# Patient Record
Sex: MALE | Race: WHITE
[De-identification: names, ages, dates, MRNs, and addresses within clinical notes are randomized per-mention and may not be internally consistent; named-entity substitution may affect disease eponyms.]

---

## 2017-10-27 ENCOUNTER — HOSPITAL ENCOUNTER (OUTPATIENT)
Dept: HOSPITAL 80 - FED | Age: 82
Setting detail: OBSERVATION
LOS: 1 days | Discharge: HOME | End: 2017-10-28
Attending: INTERNAL MEDICINE | Admitting: INTERNAL MEDICINE
Payer: COMMERCIAL

## 2017-10-27 LAB
INR PPP: 1.08 (ref 0.83–1.16)
PLATELET # BLD: 207 10^3/UL (ref 150–400)
PROTHROMBIN TIME: 13.9 SEC (ref 12–15)

## 2017-10-27 PROCEDURE — 74177 CT ABD & PELVIS W/CONTRAST: CPT

## 2017-10-27 PROCEDURE — G0378 HOSPITAL OBSERVATION PER HR: HCPCS

## 2017-10-27 RX ADMIN — SODIUM CHLORIDE SCH MLS: 900 INJECTION, SOLUTION INTRAVENOUS at 23:26

## 2017-10-27 NOTE — GHP
[f rep st]



                                                            HISTORY AND PHYSICAL





DATE OF ADMISSION:  10/27/2017



CHIEF COMPLAINT:  Bloody stool and abdominal pain.



HISTORY OF PRESENT ILLNESS:  This is an 87-year-old man with minimal past medical history who is pres
enting with 24 hours of lower abdominal pain along with diarrhea, largely consistent of bright red bl
ood.  He notes that his symptoms began shortly after eating crab cakes.  He initially thought he had 
food poisoning, but when this progressed to include bloody stool, he became more concerned.  He discu
ssed this with his primary care physician, who initially recommended he just stay at home and monitor
 his symptoms.  When the symptoms became worse, he was recommended to go to the ER for further evalua
tion.  At the time that I am evaluating him, it is now been several hours since his last bloody bowel
 movement.  His pain in his lower abdomen has improved.  He denies any fevers or chills.  He denies a
ny dizziness.



PAST MEDICAL HISTORY:  GERD, prostate cancer, chronic back pain.



PAST SURGICAL HISTORY:  Prostatectomy, ortho surgeries.



FAMILY HISTORY:  Parents are .



SOCIAL HISTORY:  He is a nonsmoker.  Rare alcohol.  No drug use.



REVIEW OF SYSTEMS:  A 10-point review of systems obtained, negative except as per HPI.



HOME MEDICATIONS:  Include tramadol, ranitidine, ibuprofen, Tylenol.



ALLERGIES:  No known drug allergies.



PHYSICAL EXAMINATION:  VITAL SIGNS:  /81, heart rate 70, respiratory rate 14, O2 sats 88% on ro
om air, temperature is 37.  GENERAL APPEARANCE:  Well-developed, well-nourished man.  Awake and alert
.  No acute distress.  EYES:  Anicteric.  ENT:  Oropharynx clear.  CARDIOVASCULAR:  Regular rate and 
rhythm.  No MRG.  PULMONARY:  CTA bilaterally.  Normal work of breathing.  ABDOMEN:  Soft, nontender.
  Positive bowel sounds.  EXTREMITIES:  No clubbing, cyanosis or edema.  SKIN:  Warm, dry, well perfu
sed.  NEURO/PSYCH:  Oriented and appropriate.  Pleasant.



LABORATORY:  Reviewed.  Significant for white blood cell count of 12.5.  Chemistry and LFTs are unrem
arkable.  Hematocrit 47.4. 



Abdominal CT, personally reviewed and interpreted, shows descending colitis without perforation or ob
struction.



ASSESSMENT AND PLAN:  This is an 87-year-old man with past medical history of prostate cancer, presen
ting with descending colitis, presumably infectious.

1.  Infectious colitis.  Again, patient presenting with abdominal pain, diarrhea and ultimately blood
y diarrhea with evidence of descending colitis on CT.  He was started on Cipro and Flagyl in the [facility] department and this will be continued for the time being.  He has been unable to have another [employer] movement, but GI pathogen panel is currently pending.  Another consideration would be for ischem
ic colitis, especially given his age.  Though in discussion with Radiology, has less of that appearan
ce and overall felt to be more likely infectious in nature.  

2.  Bright red blood per rectum.  Again, this in the setting of above and suspect it is related to th
e same.  At this point, I do not believe that he will require a colonoscopy though if bleeding recurs
, this would need to be considered.  He is currently hemodynamically stable.  H and H are at baseline
.

3.  History of prostate cancer.  This is not likely to be contributing to his current presentation.  
We will continue his usual surveillance.

4.  Leukocytosis.  This is likely stress response and/or related to infectious colitis.  We will repe
at a CBC in the morning.

5.  Disposition:  Observation status.  Suspect he will need less than 48-hour stay for evaluation and
 management of above.

6.  Patient is new to my care.  Old records reviewed and summarized as per HPI and past medical histo
ry.  Care plan reviewed with ER physician including plans for antibiotic therapy.





Job #:  [id number]

## 2017-10-27 NOTE — GHP
[f rep st]



                                                            HISTORY AND PHYSICAL





DATE OF ADMISSION:  10/27/2017



CHIEF COMPLAINT:  Bloody stool and abdominal pain.



HISTORY OF PRESENT ILLNESS:  This is an 87-year-old man with minimal past medical history who is pres
enting with 24 hours of lower abdominal pain along with diarrhea, largely consistent of bright red bl
ood.  He notes that his symptoms began shortly after eating crab cakes.  He initially thought he had 
food poisoning, but when this progressed to include bloody stool, he became more concerned.  He discu
ssed this with his primary care physician, who initially recommended he just stay at home and monitor
 his symptoms.  When the symptoms became worse, he was recommended to go to the ER for further evalua
tion.  At the time that I am evaluating him, it is now been several hours since his last bloody bowel
 movement.  His pain in his lower abdomen has improved.  He denies any fevers or chills.  He denies a
ny dizziness.



PAST MEDICAL HISTORY:  GERD, prostate cancer, chronic back pain.



PAST SURGICAL HISTORY:  Prostatectomy, ortho surgeries.



FAMILY HISTORY:  Parents are .



SOCIAL HISTORY:  He is a nonsmoker.  Rare alcohol.  No drug use.



REVIEW OF SYSTEMS:  A 10-point review of systems obtained, negative except as per HPI.



HOME MEDICATIONS:  Include tramadol, ranitidine, ibuprofen, Tylenol.



ALLERGIES:  No known drug allergies.



PHYSICAL EXAMINATION:  VITAL SIGNS:  /81, heart rate 70, respiratory rate 14, O2 sats 88% on ro
om air, temperature is 37.  GENERAL APPEARANCE:  Well-developed, well-nourished man.  Awake and alert
.  No acute distress.  EYES:  Anicteric.  ENT:  Oropharynx clear.  CARDIOVASCULAR:  Regular rate and 
rhythm.  No MRG.  PULMONARY:  CTA bilaterally.  Normal work of breathing.  ABDOMEN:  Soft, nontender.
  Positive bowel sounds.  EXTREMITIES:  No clubbing, cyanosis or edema.  SKIN:  Warm, dry, well perfu
sed.  NEURO/PSYCH:  Oriented and appropriate.  Pleasant.



LABORATORY:  Reviewed.  Significant for white blood cell count of 12.5.  Chemistry and LFTs are unrem
arkable.  Hematocrit 47.4. 



Abdominal CT, personally reviewed and interpreted, shows descending colitis without perforation or ob
struction.



ASSESSMENT AND PLAN:  This is an 87-year-old man with past medical history of prostate cancer, presen
ting with descending colitis, presumably infectious.

1.  Infectious colitis.  Again, patient presenting with abdominal pain, diarrhea and ultimately blood
y diarrhea with evidence of descending colitis on CT.  He was started on Cipro and Flagyl in the [facility] department and this will be continued for the time being.  He has been unable to have another [employer] movement, but GI pathogen panel is currently pending.  Another consideration would be for ischem
ic colitis, especially given his age.  Though in discussion with Radiology, has less of that appearan
ce and overall felt to be more likely infectious in nature.  

2.  Bright red blood per rectum.  Again, this in the setting of above and suspect it is related to th
e same.  At this point, I do not believe that he will require a colonoscopy though if bleeding recurs
, this would need to be considered.  He is currently hemodynamically stable.  H and H are at baseline
.

3.  History of prostate cancer.  This is not likely to be contributing to his current presentation.  
We will continue his usual surveillance.

4.  Leukocytosis.  This is likely stress response and/or related to infectious colitis.  We will repe
at a CBC in the morning.

5.  Disposition:  Observation status.  Suspect he will need less than 48-hour stay for evaluation and
 management of above.

6.  Patient is new to my care.  Old records reviewed and summarized as per HPI and past medical histo
ry.  Care plan reviewed with ER physician including plans for antibiotic therapy.





Job #:  [id number]

## 2017-10-27 NOTE — EDPHY
H & P


Stated Complaint: llq abd pain/rectal bleeding


HPI/ROS: 





HPI





CHIEF COMPLAINT:  Left lower quadrant abdominal pain,  bright red blood per 

rectum.





HISTORY OF PRESENT ILLNESS:  Patient is a 87-year-old male, history of prostate 

cancer, presents emergency room bright red blood per rectum x1 day.  States 8-9 

episodes of bright red blood per rectum.  This started around 9:00 p.m. last 

night.  Patient reports that last night at 9:00 p.m. he had to have a bowel 

movement and passed bright red blood per rectum.  Again around 2:00 a.m. just 

blood.  Denies any diarrhea.  However he does state that he has some left lower 

quadrant crampy abdominal pain associated with this.  With associated nausea 

but no vomiting.  No fever.  No chest pain or shortness of breath.  He is not 

on any anticoagulation.  He has not any vomiting.  Denies any melenic dark 

stools.





Past Medical History:  Prostate cancer





Past Surgical History:  No recent surgery





Social History:  Denies daily use drugs alcohol tobacco products.





Family History:  Noncontributory.








ROS   


REVIEW OF SYSTEMS:


A comprehensive 10 point review of systems is otherwise negative aside from 

elements mentioned in the history of present illness.








Exam   


Constitutional  appears well nontoxic, triage nursing summary reviewed, vital 

signs reviewed, awake/alert. 


Eyes   normal conjunctivae and sclera, EOMI, PERRLA. 


HENT   normal inspection, atraumatic, moist mucus membranes, no epistaxis, neck 

supple/ no meningismus, no raccoon eyes. 


Respiratory   clear to auscultation bilaterally, normal breath sounds, no 

respiratory distress, no wheezing. 


Cardiovascular   rate normal, regular rhythm, no murmur, no edema, distal 

pulses normal. 


Gastrointestinal   soft, non-tender, no rebound, no guarding, normal bowel 

sounds, no distension, no pulsatile mass. 


Genitourinary   no CVA tenderness. 


Musculoskeletal  no midline vertebral tenderness, full range of motion, no calf 

swelling, no tenderness of extremities, no meningismus, good pulses, 

neurovascularly intact.


Skin   pink, warm, & dry, no rash, skin atraumatic. 


Neurologic   awake, alert and oriented x 3, AAOx3, moves all 4 extremities 

equally, motor intact, sensory intact, CN II-XII intact, normal cerebellar, 

normal vision, normal speech. 


Psychiatric   normal mood/affect. 


Heme/Lymph/Immune   no lymphadenopathy.





Differential Diagnosis:  Includes but is not limited to in a particular order, 

diverticular bleed, acute diverticulitis, hemorrhoidal bleed, colitis, GI bleed





Medical Decision Making:  Plan for this patient IV establishment IV fluid bolus

, type and screen, check basic blood work CBC, CT scan abdomen pelvis with IV 

contrast rule out acute colitis vs diverticulitis.





Re-evaluation:








1837:  Patient CT scan called to me by Dr. Oppenheimer.  Shows diffuse 

descending colitis.  No free air.  No free fluid.  Patient be admitted to the 

hospitalist service for bright red blood per rectum and colitis.  Lactic acid 

is less than 2. Abdomen is not peritoneal there is no pain out of proportion 

with exam.  This most likely infectious colitis.  Will start with IV Flagyl and 

Cipro.  Will admit to the hospitalist service.  


Source: Patient





- Personal History


Current Tetanus/Diphtheria Vaccine: Unsure





- Medical/Surgical History


Hx Asthma: No


Hx Chronic Respiratory Disease: No


Hx Diabetes: No


Hx Cardiac Disease: No


Hx Renal Disease: No


Hx Cirrhosis: No


Hx Alcoholism: No


Hx HIV/AIDS: No


Hx Splenectomy or Spleen Trauma: No


Other PMH: back pain/ arthritis/ortho surg.





- Social History


Smoking Status: Never smoked


Constitutional: 


 Initial Vital Signs











Temperature (C)  37 C   10/27/17 16:24


 


Heart Rate  76   10/27/17 16:24


 


Respiratory Rate  17   10/27/17 16:24


 


Blood Pressure  138/97 H  10/27/17 16:24


 


O2 Sat (%)  93   10/27/17 16:24








 











O2 Delivery Mode               Room Air














Allergies/Adverse Reactions: 


 





No Known Allergies Allergy (Verified 10/27/17 16:24)


 








Home Medications: 














 Medication  Instructions  Recorded


 


NK [No Known Home Meds]  10/27/17














Medical Decision Making





- Diagnostics


Imaging Results: 


 Imaging Impressions





Abdomen CT  10/27/17 16:36


Impression: Descending colitis without perforation or obstruction.


 


Results called Dr. Zayas at 6:04. 


 


General information for patients regarding this examination can be found at 

[email or web address].


 


If you have questions or comments about this report, please contact me at [phone] (hospital) or [phone] (cell). 


 














- Data Points


Laboratory Results: 


 Laboratory Results





 10/27/17 16:59 





 10/27/17 16:59 





 











  10/27/17 10/27/17 10/27/17





  16:59 16:59 16:59


 


WBC      12.56 10^3/uL H 10^3/uL





     (3.80-9.50) 


 


RBC      5.03 10^6/uL 10^6/uL





     (4.40-6.38) 


 


Hgb      16.4 g/dL g/dL





     (13.7-17.5) 


 


Hct      47.4 % %





     (40.0-51.0) 


 


MCV      94.2 fL fL





     (81.5-99.8) 


 


MCH      32.6 pg pg





     (27.9-34.1) 


 


MCHC      34.6 g/dL g/dL





     (32.4-36.7) 


 


RDW      13.8 % %





     (11.5-15.2) 


 


Plt Count      207 10^3/uL 10^3/uL





     (150-400) 


 


MPV      8.3 fL L fL





     (8.7-11.7) 


 


Neut % (Auto)      75.8 % H %





     (39.3-74.2) 


 


Lymph % (Auto)      13.1 % L %





     (15.0-45.0) 


 


Mono % (Auto)      9.5 % %





     (4.5-13.0) 


 


Eos % (Auto)      1.1 % %





     (0.6-7.6) 


 


Baso % (Auto)      0.3 % %





     (0.3-1.7) 


 


Nucleat RBC Rel Count      0.0 % %





     (0.0-0.2) 


 


Absolute Neuts (auto)      9.51 10^3/uL H 10^3/uL





     (1.70-6.50) 


 


Absolute Lymphs (auto)      1.65 10^3/uL 10^3/uL





     (1.00-3.00) 


 


Absolute Monos (auto)      1.19 10^3/uL H 10^3/uL





     (0.30-0.80) 


 


Absolute Eos (auto)      0.14 10^3/uL 10^3/uL





     (0.03-0.40) 


 


Absolute Basos (auto)      0.04 10^3/uL 10^3/uL





     (0.02-0.10) 


 


Absolute Nucleated RBC      0.00 10^3/uL 10^3/uL





     (0-0.01) 


 


Immature Gran %      0.2 % %





     (0.0-1.1) 


 


Immature Gran #      0.03 10^3/uL 10^3/uL





     (0.00-0.10) 


 


PT    13.9 SEC SEC  





    (12.0-15.0)  


 


INR    1.08   





    (0.83-1.16)  


 


APTT    30.9 SEC SEC  





    (23.0-38.0)  


 


VBG Lactic Acid      





    


 


Sodium  141 mEq/L mEq/L    





   (134-144)   


 


Potassium  4.0 mEq/L mEq/L    





   (3.5-5.2)   


 


Chloride  105 mEq/L mEq/L    





   ()   


 


Carbon Dioxide  20 mEq/l L mEq/l    





   (22-31)   


 


Anion Gap  16 mEq/L mEq/L    





   (8-16)   


 


BUN  11 mg/dL mg/dL    





   (7-23)   


 


Creatinine  0.9 mg/dL mg/dL    





   (0.7-1.3)   


 


Estimated GFR  > 60     





    


 


Glucose  99 mg/dL mg/dL    





   ()   


 


Calcium  9.0 mg/dL mg/dL    





   (8.5-10.4)   


 


Total Bilirubin  1.2 mg/dL mg/dL    





   (0.1-1.4)   


 


Conjugated Bilirubin  0.3 mg/dL mg/dL    





   (0.0-0.5)   


 


Unconjugated Bilirubin  0.9 mg/dL mg/dL    





   (0.0-1.1)   


 


AST  23 IU/L IU/L    





   (17-59)   


 


ALT  26 IU/L IU/L    





   (21-72)   


 


Alkaline Phosphatase  78 IU/L IU/L    





   ()   


 


Total Protein  7.1 g/dL g/dL    





   (6.3-8.2)   


 


Albumin  4.4 g/dL g/dL    





   (3.5-5.0)   


 


Lipase  110 IU/L IU/L    





   ()   














  10/27/17





  16:59


 


WBC  





  


 


RBC  





  


 


Hgb  





  


 


Hct  





  


 


MCV  





  


 


MCH  





  


 


MCHC  





  


 


RDW  





  


 


Plt Count  





  


 


MPV  





  


 


Neut % (Auto)  





  


 


Lymph % (Auto)  





  


 


Mono % (Auto)  





  


 


Eos % (Auto)  





  


 


Baso % (Auto)  





  


 


Nucleat RBC Rel Count  





  


 


Absolute Neuts (auto)  





  


 


Absolute Lymphs (auto)  





  


 


Absolute Monos (auto)  





  


 


Absolute Eos (auto)  





  


 


Absolute Basos (auto)  





  


 


Absolute Nucleated RBC  





  


 


Immature Gran %  





  


 


Immature Gran #  





  


 


PT  





  


 


INR  





  


 


APTT  





  


 


VBG Lactic Acid  1.2 mmol/L mmol/L





   (0.7-2.1) 


 


Sodium  





  


 


Potassium  





  


 


Chloride  





  


 


Carbon Dioxide  





  


 


Anion Gap  





  


 


BUN  





  


 


Creatinine  





  


 


Estimated GFR  





  


 


Glucose  





  


 


Calcium  





  


 


Total Bilirubin  





  


 


Conjugated Bilirubin  





  


 


Unconjugated Bilirubin  





  


 


AST  





  


 


ALT  





  


 


Alkaline Phosphatase  





  


 


Total Protein  





  


 


Albumin  





  


 


Lipase  





  











Medications Given: 


 





Metronidazole/Sodium Chloride (Flagyl 500 Mg (Premix))  100 mls @ 100 mls/hr IV 

EDNOW ONE


   PRN Reason: Protocol


   Stop: 10/27/17 19:11


   Last Admin: 10/27/17 18:33 Dose:  100 mls





Discontinued Medications





Sodium Chloride (Ns)  1,000 mls @ 0 mls/hr IV EDNOW ONE; [facility]


   PRN Reason: Protocol


   Stop: 10/27/17 16:37


   Last Admin: 10/27/17 16:57 Dose:  1,000 mls


Ondansetron HCl (Zofran)  4 mg IVP EDNOW ONE


   Stop: 10/27/17 16:37


   Last Admin: 10/27/17 16:58 Dose:  4 mg








Departure





- Departure


Disposition: [facility]


Clinical Impression: 


 Colitis





Condition: Fair

## 2017-10-27 NOTE — EDPHY
H & P


Stated Complaint: llq abd pain/rectal bleeding


HPI/ROS: 





HPI





CHIEF COMPLAINT:  Left lower quadrant abdominal pain,  bright red blood per 

rectum.





HISTORY OF PRESENT ILLNESS:  Patient is a 87-year-old male, history of prostate 

cancer, presents emergency room bright red blood per rectum x1 day.  States 8-9 

episodes of bright red blood per rectum.  This started around 9:00 p.m. last 

night.  Patient reports that last night at 9:00 p.m. he had to have a bowel 

movement and passed bright red blood per rectum.  Again around 2:00 a.m. just 

blood.  Denies any diarrhea.  However he does state that he has some left lower 

quadrant crampy abdominal pain associated with this.  With associated nausea 

but no vomiting.  No fever.  No chest pain or shortness of breath.  He is not 

on any anticoagulation.  He has not any vomiting.  Denies any melenic dark 

stools.





Past Medical History:  Prostate cancer





Past Surgical History:  No recent surgery





Social History:  Denies daily use drugs alcohol tobacco products.





Family History:  Noncontributory.








ROS   


REVIEW OF SYSTEMS:


A comprehensive 10 point review of systems is otherwise negative aside from 

elements mentioned in the history of present illness.








Exam   


Constitutional  appears well nontoxic, triage nursing summary reviewed, vital 

signs reviewed, awake/alert. 


Eyes   normal conjunctivae and sclera, EOMI, PERRLA. 


HENT   normal inspection, atraumatic, moist mucus membranes, no epistaxis, neck 

supple/ no meningismus, no raccoon eyes. 


Respiratory   clear to auscultation bilaterally, normal breath sounds, no 

respiratory distress, no wheezing. 


Cardiovascular   rate normal, regular rhythm, no murmur, no edema, distal 

pulses normal. 


Gastrointestinal   soft, non-tender, no rebound, no guarding, normal bowel 

sounds, no distension, no pulsatile mass. 


Genitourinary   no CVA tenderness. 


Musculoskeletal  no midline vertebral tenderness, full range of motion, no calf 

swelling, no tenderness of extremities, no meningismus, good pulses, 

neurovascularly intact.


Skin   pink, warm, & dry, no rash, skin atraumatic. 


Neurologic   awake, alert and oriented x 3, AAOx3, moves all 4 extremities 

equally, motor intact, sensory intact, CN II-XII intact, normal cerebellar, 

normal vision, normal speech. 


Psychiatric   normal mood/affect. 


Heme/Lymph/Immune   no lymphadenopathy.





Differential Diagnosis:  Includes but is not limited to in a particular order, 

diverticular bleed, acute diverticulitis, hemorrhoidal bleed, colitis, GI bleed





Medical Decision Making:  Plan for this patient IV establishment IV fluid bolus

, type and screen, check basic blood work CBC, CT scan abdomen pelvis with IV 

contrast rule out acute colitis vs diverticulitis.





Re-evaluation:








1837:  Patient CT scan called to me by Dr. Oppenheimer.  Shows diffuse 

descending colitis.  No free air.  No free fluid.  Patient be admitted to the 

hospitalist service for bright red blood per rectum and colitis.  Lactic acid 

is less than 2. Abdomen is not peritoneal there is no pain out of proportion 

with exam.  This most likely infectious colitis.  Will start with IV Flagyl and 

Cipro.  Will admit to the hospitalist service.  


Source: Patient





- Personal History


Current Tetanus/Diphtheria Vaccine: Unsure





- Medical/Surgical History


Hx Asthma: No


Hx Chronic Respiratory Disease: No


Hx Diabetes: No


Hx Cardiac Disease: No


Hx Renal Disease: No


Hx Cirrhosis: No


Hx Alcoholism: No


Hx HIV/AIDS: No


Hx Splenectomy or Spleen Trauma: No


Other PMH: back pain/ arthritis/ortho surg.





- Social History


Smoking Status: Never smoked


Constitutional: 


 Initial Vital Signs











Temperature (C)  37 C   10/27/17 16:24


 


Heart Rate  76   10/27/17 16:24


 


Respiratory Rate  17   10/27/17 16:24


 


Blood Pressure  138/97 H  10/27/17 16:24


 


O2 Sat (%)  93   10/27/17 16:24








 











O2 Delivery Mode               Room Air














Allergies/Adverse Reactions: 


 





No Known Allergies Allergy (Verified 10/27/17 16:24)


 








Home Medications: 














 Medication  Instructions  Recorded


 


NK [No Known Home Meds]  10/27/17














Medical Decision Making





- Diagnostics


Imaging Results: 


 Imaging Impressions





Abdomen CT  10/27/17 16:36


Impression: Descending colitis without perforation or obstruction.


 


Results called Dr. Zayas at 6:04. 


 


General information for patients regarding this examination can be found at 

[email or web address].


 


If you have questions or comments about this report, please contact me at [phone] (hospital) or [phone] (cell). 


 














- Data Points


Laboratory Results: 


 Laboratory Results





 10/27/17 16:59 





 10/27/17 16:59 





 











  10/27/17 10/27/17 10/27/17





  16:59 16:59 16:59


 


WBC      12.56 10^3/uL H 10^3/uL





     (3.80-9.50) 


 


RBC      5.03 10^6/uL 10^6/uL





     (4.40-6.38) 


 


Hgb      16.4 g/dL g/dL





     (13.7-17.5) 


 


Hct      47.4 % %





     (40.0-51.0) 


 


MCV      94.2 fL fL





     (81.5-99.8) 


 


MCH      32.6 pg pg





     (27.9-34.1) 


 


MCHC      34.6 g/dL g/dL





     (32.4-36.7) 


 


RDW      13.8 % %





     (11.5-15.2) 


 


Plt Count      207 10^3/uL 10^3/uL





     (150-400) 


 


MPV      8.3 fL L fL





     (8.7-11.7) 


 


Neut % (Auto)      75.8 % H %





     (39.3-74.2) 


 


Lymph % (Auto)      13.1 % L %





     (15.0-45.0) 


 


Mono % (Auto)      9.5 % %





     (4.5-13.0) 


 


Eos % (Auto)      1.1 % %





     (0.6-7.6) 


 


Baso % (Auto)      0.3 % %





     (0.3-1.7) 


 


Nucleat RBC Rel Count      0.0 % %





     (0.0-0.2) 


 


Absolute Neuts (auto)      9.51 10^3/uL H 10^3/uL





     (1.70-6.50) 


 


Absolute Lymphs (auto)      1.65 10^3/uL 10^3/uL





     (1.00-3.00) 


 


Absolute Monos (auto)      1.19 10^3/uL H 10^3/uL





     (0.30-0.80) 


 


Absolute Eos (auto)      0.14 10^3/uL 10^3/uL





     (0.03-0.40) 


 


Absolute Basos (auto)      0.04 10^3/uL 10^3/uL





     (0.02-0.10) 


 


Absolute Nucleated RBC      0.00 10^3/uL 10^3/uL





     (0-0.01) 


 


Immature Gran %      0.2 % %





     (0.0-1.1) 


 


Immature Gran #      0.03 10^3/uL 10^3/uL





     (0.00-0.10) 


 


PT    13.9 SEC SEC  





    (12.0-15.0)  


 


INR    1.08   





    (0.83-1.16)  


 


APTT    30.9 SEC SEC  





    (23.0-38.0)  


 


VBG Lactic Acid      





    


 


Sodium  141 mEq/L mEq/L    





   (134-144)   


 


Potassium  4.0 mEq/L mEq/L    





   (3.5-5.2)   


 


Chloride  105 mEq/L mEq/L    





   ()   


 


Carbon Dioxide  20 mEq/l L mEq/l    





   (22-31)   


 


Anion Gap  16 mEq/L mEq/L    





   (8-16)   


 


BUN  11 mg/dL mg/dL    





   (7-23)   


 


Creatinine  0.9 mg/dL mg/dL    





   (0.7-1.3)   


 


Estimated GFR  > 60     





    


 


Glucose  99 mg/dL mg/dL    





   ()   


 


Calcium  9.0 mg/dL mg/dL    





   (8.5-10.4)   


 


Total Bilirubin  1.2 mg/dL mg/dL    





   (0.1-1.4)   


 


Conjugated Bilirubin  0.3 mg/dL mg/dL    





   (0.0-0.5)   


 


Unconjugated Bilirubin  0.9 mg/dL mg/dL    





   (0.0-1.1)   


 


AST  23 IU/L IU/L    





   (17-59)   


 


ALT  26 IU/L IU/L    





   (21-72)   


 


Alkaline Phosphatase  78 IU/L IU/L    





   ()   


 


Total Protein  7.1 g/dL g/dL    





   (6.3-8.2)   


 


Albumin  4.4 g/dL g/dL    





   (3.5-5.0)   


 


Lipase  110 IU/L IU/L    





   ()   














  10/27/17





  16:59


 


WBC  





  


 


RBC  





  


 


Hgb  





  


 


Hct  





  


 


MCV  





  


 


MCH  





  


 


MCHC  





  


 


RDW  





  


 


Plt Count  





  


 


MPV  





  


 


Neut % (Auto)  





  


 


Lymph % (Auto)  





  


 


Mono % (Auto)  





  


 


Eos % (Auto)  





  


 


Baso % (Auto)  





  


 


Nucleat RBC Rel Count  





  


 


Absolute Neuts (auto)  





  


 


Absolute Lymphs (auto)  





  


 


Absolute Monos (auto)  





  


 


Absolute Eos (auto)  





  


 


Absolute Basos (auto)  





  


 


Absolute Nucleated RBC  





  


 


Immature Gran %  





  


 


Immature Gran #  





  


 


PT  





  


 


INR  





  


 


APTT  





  


 


VBG Lactic Acid  1.2 mmol/L mmol/L





   (0.7-2.1) 


 


Sodium  





  


 


Potassium  





  


 


Chloride  





  


 


Carbon Dioxide  





  


 


Anion Gap  





  


 


BUN  





  


 


Creatinine  





  


 


Estimated GFR  





  


 


Glucose  





  


 


Calcium  





  


 


Total Bilirubin  





  


 


Conjugated Bilirubin  





  


 


Unconjugated Bilirubin  





  


 


AST  





  


 


ALT  





  


 


Alkaline Phosphatase  





  


 


Total Protein  





  


 


Albumin  





  


 


Lipase  





  











Medications Given: 


 





Metronidazole/Sodium Chloride (Flagyl 500 Mg (Premix))  100 mls @ 100 mls/hr IV 

EDNOW ONE


   PRN Reason: Protocol


   Stop: 10/27/17 19:11


   Last Admin: 10/27/17 18:33 Dose:  100 mls





Discontinued Medications





Sodium Chloride (Ns)  1,000 mls @ 0 mls/hr IV EDNOW ONE; [facility] [place]


   PRN Reason: Protocol


   Stop: 10/27/17 16:37


   Last Admin: 10/27/17 16:57 Dose:  1,000 mls


Ondansetron HCl (Zofran)  4 mg IVP EDNOW ONE


   Stop: 10/27/17 16:37


   Last Admin: 10/27/17 16:58 Dose:  4 mg








Departure





- Departure


Disposition: [facility]


Clinical Impression: 


 Colitis





Condition: Fair

## 2017-10-27 NOTE — GHP
[f rep st]



                                                            HISTORY AND PHYSICAL





DATE OF ADMISSION:  10/27/2017



CHIEF COMPLAINT:  Bloody stool and abdominal pain.



HISTORY OF PRESENT ILLNESS:  This is an 87-year-old man with minimal past medical history who is pres
enting with 24 hours of lower abdominal pain along with diarrhea, largely consistent of bright red bl
ood.  He notes that his symptoms began shortly after eating crab cakes.  He initially thought he had 
food poisoning, but when this progressed to include bloody stool, he became more concerned.  He discu
ssed this with his primary care physician, who initially recommended he just stay at home and monitor
 his symptoms.  When the symptoms became worse, he was recommended to go to the ER for further evalua
tion.  At the time that I am evaluating him, it is now been several hours since his last bloody bowel
 movement.  His pain in his lower abdomen has improved.  He denies any fevers or chills.  He denies a
ny dizziness.



PAST MEDICAL HISTORY:  GERD, prostate cancer, chronic back pain.



PAST SURGICAL HISTORY:  Prostatectomy, ortho surgeries.



FAMILY HISTORY:  Parents are .



SOCIAL HISTORY:  He is a nonsmoker.  Rare alcohol.  No drug use.



REVIEW OF SYSTEMS:  A 10-point review of systems obtained, negative except as per HPI.



HOME MEDICATIONS:  Include tramadol, ranitidine, ibuprofen, Tylenol.



ALLERGIES:  No known drug allergies.



PHYSICAL EXAMINATION:  VITAL SIGNS:  /81, heart rate 70, respiratory rate 14, O2 sats 88% on ro
om air, temperature is 37.  GENERAL APPEARANCE:  Well-developed, well-nourished man.  Awake and alert
.  No acute distress.  EYES:  Anicteric.  ENT:  Oropharynx clear.  CARDIOVASCULAR:  Regular rate and 
rhythm.  No MRG.  PULMONARY:  CTA bilaterally.  Normal work of breathing.  ABDOMEN:  Soft, nontender.
  Positive bowel sounds.  EXTREMITIES:  No clubbing, cyanosis or edema.  SKIN:  Warm, dry, well perfu
sed.  NEURO/PSYCH:  Oriented and appropriate.  Pleasant.



LABORATORY:  Reviewed.  Significant for white blood cell count of 12.5.  Chemistry and LFTs are unrem
arkable.  Hematocrit 47.4. 



Abdominal CT, personally reviewed and interpreted, shows descending colitis without perforation or ob
struction.



ASSESSMENT AND PLAN:  This is an 87-year-old man with past medical history of prostate cancer, presen
ting with descending colitis, presumably infectious.

1.  Infectious colitis.  Again, patient presenting with abdominal pain, diarrhea and ultimately blood
y diarrhea with evidence of descending colitis on CT.  He was started on Cipro and Flagyl in the [facility] department and this will be continued for the time being.  He has been unable to have another [employer] movement, but GI pathogen panel is currently pending.  Another consideration would be for ischem
ic colitis, especially given his age.  Though in discussion with Radiology, has less of that appearan
ce and overall felt to be more likely infectious in nature.  

2.  Bright red blood per rectum.  Again, this in the setting of above and suspect it is related to th
e same.  At this point, I do not believe that he will require a colonoscopy though if bleeding recurs
, this would need to be considered.  He is currently hemodynamically stable.  H and H are at baseline
.

3.  History of prostate cancer.  This is not likely to be contributing to his current presentation.  
We will continue his usual surveillance.

4.  Leukocytosis.  This is likely stress response and/or related to infectious colitis.  We will repe
at a CBC in the morning.

5.  Disposition:  Observation status.  Suspect he will need less than 48-hour stay for evaluation and
 management of above.

6.  Patient is new to my care.  Old records reviewed and summarized as per HPI and past medical histo
ry.  Care plan reviewed with ER physician including plans for antibiotic therapy.





Job #:  [id number]

## 2017-10-28 VITALS
HEART RATE: 67 BPM | RESPIRATION RATE: 18 BRPM | TEMPERATURE: 98.4 F | SYSTOLIC BLOOD PRESSURE: 132 MMHG | DIASTOLIC BLOOD PRESSURE: 72 MMHG

## 2017-10-28 VITALS — OXYGEN SATURATION: 92 %

## 2017-10-28 LAB — PLATELET # BLD: 177 10^3/UL (ref 150–400)

## 2017-10-28 RX ADMIN — SODIUM CHLORIDE SCH MLS: 900 INJECTION, SOLUTION INTRAVENOUS at 08:04

## 2017-10-28 NOTE — ASMTCMCOM
CM Note

 

CM Note                       

Notes:

Spoke w/NP, anticipate pt will dc home w/support of daughter, pt independent at baseline. CM 

available for any changes.

 

Date Signed:  10/28/2017 10:48 AM

Electronically Signed By:Bhumika Stanton RN

## 2017-10-28 NOTE — ASDISCHSUM
----------------------------------------------

Discharge Information

----------------------------------------------

Plan Status:Home with No Needs                       Medically Cleared to Leave:

Discharge Date:10/28/2017 11:30 AM                   CM D/C Disposition:Home, Routine, Self-Care

ADT D/C Disposition:Home, Routine, Self-Care         Projected Discharge Date:10/28/2017 11:30 AM

Transportation at D/C:Family                         Discharge Delay Reason:

Follow-Up Date:10/28/2017 11:30 AM                   Discharge Slot:

Final Diagnosis:

----------------------------------------------

Placement Information

----------------------------------------------

----------------------------------------------

Patient Contact Information

----------------------------------------------

Contact Name:ROBIN                             Relationship:Daughter

Address:[address]                                Home [phone]

                                                     Work Phone:

City:[place]                                         Alternate Phone:

[facility]/Zip Code:[place] [address]                              Email:

----------------------------------------------

Financial Information

----------------------------------------------

Financial Class:

Primary Plan Desc:MEDICARE INPATIENT                 Primary Plan Number:[id number]

Secondary Plan Desc:[place]                          Secondary Plan Number:[id number]

 

 

----------------------------------------------

Assessment Information

----------------------------------------------

----------------------------------------------

[facility] CM Progress Note

----------------------------------------------

CM Note

 

CM Note                       

Notes:

Spoke w/NP, anticipate pt will dc home w/support of daughter, pt independent at baseline. CM 

available for any changes.

 

Date Signed:  10/28/2017 10:48 AM

Electronically Signed By:Bhumika Stanton RN

 

 

----------------------------------------------

Intervention Information

----------------------------------------------

Intervention Type:*Incorrect Registration            Date of Service:10/27/2017 06:47 PM

Patient Type:Observation                             Staff Member:KAYLIN Gudino Shelly

Hours:0.25                                           Discipline:

Severity:1 (0-1 Hours)                               Comment:Registered inpatient, admit order writ
ten 

                                                              observation status.

## 2017-10-28 NOTE — GDS
[f rep st]



                                                             DISCHARGE SUMMARY





DISCHARGE DIAGNOSIS:  Colitis.



STUDIES AND PROCEDURES DONE:  CT scan of the abdomen.



PHYSICAL EXAM:  GENERAL:  The patient is alert.  VITAL SIGNS:  Afebrile at 36.9, pulse is 67, respira
tory rate is 18, blood pressure 132/72, saturating 95% on 1 L greater than 90% on room air.  I have s
een and evaluated the patient on the day of discharge.



HOSPITAL COURSE:  The patient is an 87-year-old male who presented to the emergency room with complai
nts of bloody stool and abdominal pain.  He was evaluated and diagnosed with.

1.  Infectious colitis.  During this hospitalization, he was treated with ciprofloxacin as well as Fl
agyl.  His symptoms have improved.  He has had no further bouts of diarrhea during this hospital cour
se.  He does still have mild left quadrant abdominal tenderness, however, he is tolerating a regular 
diet.  He has had no more bleeding or complaints.

2.  Bright red blood per rectum.  Again, this is in the setting of colitis.  This has resolved.  His 
hemoglobin and hematocrit have remained stable.

3.  History of prostate cancer.  No intervention warranted.

4.  Leukocytosis.  This is in the setting of an acute response.  His white count is improving at disc
harge.

5.  Back pain:  The patient was treated during this hospitalization with a small dose of oxycodone IR
.  I am prescribing a 5 mg tablet of oxycodone IR first thing in the morning for the patient for his 
chronic back pain given his osteoarthritis.



DISPOSITION:  The patient will be discharged home independently with his daughter.  There are no pend
ing studies.



FOLLOWUP:  Will be with his primary care physician, Dr. Luther Bella as well as Dr. Milad Barnhart, h
is rheumatologist.



DISCHARGE MEDICATIONS:  Please refer to EMR form.  I have provided the patient a prescription for cip
rofloxacin as well as Flagyl and oxycodone IR.





Job #:  [id number]

## 2017-10-28 NOTE — GDS
[f rep st]



                                                             DISCHARGE SUMMARY





DISCHARGE DIAGNOSIS:  Colitis.



STUDIES AND PROCEDURES DONE:  CT scan of the abdomen.



PHYSICAL EXAM:  GENERAL:  The patient is alert.  VITAL SIGNS:  Afebrile at 36.9, pulse is 67, respira
tory rate is 18, blood pressure 132/72, saturating 95% on 1 L greater than 90% on room air.  I have s
een and evaluated the patient on the day of discharge.



HOSPITAL COURSE:  The patient is an 87-year-old male who presented to the emergency room with complai
nts of bloody stool and abdominal pain.  He was evaluated and diagnosed with.

1.  Infectious colitis.  During this hospitalization, he was treated with ciprofloxacin as well as Fl
agyl.  His symptoms have improved.  He has had no further bouts of diarrhea during this hospital cour
se.  He does still have mild left quadrant abdominal tenderness, however, he is tolerating a regular 
diet.  He has had no more bleeding or complaints.

2.  Bright red blood per rectum.  Again, this is in the setting of colitis.  This has resolved.  His 
hemoglobin and hematocrit have remained stable.

3.  History of prostate cancer.  No intervention warranted.

4.  Leukocytosis.  This is in the setting of an acute response.  His white count is improving at disc
harge.

5.  Back pain:  The patient was treated during this hospitalization with a small dose of oxycodone IR
.  I am prescribing a 5 mg tablet of oxycodone IR first thing in the morning for the patient for his 
chronic back pain given his osteoarthritis.



DISPOSITION:  The patient will be discharged home independently with his daughter.  There are no pend
ing studies.



FOLLOWUP:  Will be with his primary care physician, Dr. Luther Bella as well as Dr. Milad Barnhart, h
is rheumatologist.



DISCHARGE MEDICATIONS:  Please refer to EMR form.  I have provided the patient a prescription for cip
rofloxacin as well as Flagyl and oxycodone IR.





Job #:  [id number]

## 2017-10-28 NOTE — GDS
[f rep st]



                                                             DISCHARGE SUMMARY





DISCHARGE DIAGNOSIS:  Colitis.



STUDIES AND PROCEDURES DONE:  CT scan of the abdomen.



PHYSICAL EXAM:  GENERAL:  The patient is alert.  VITAL SIGNS:  Afebrile at 36.9, pulse is 67, respira
tory rate is 18, blood pressure 132/72, saturating 95% on 1 L greater than 90% on room air.  I have s
een and evaluated the patient on the day of discharge.



HOSPITAL COURSE:  The patient is an 87-year-old male who presented to the emergency room with complai
nts of bloody stool and abdominal pain.  He was evaluated and diagnosed with.

1.  Infectious colitis.  During this hospitalization, he was treated with ciprofloxacin as well as Fl
agyl.  His symptoms have improved.  He has had no further bouts of diarrhea during this hospital cour
se.  He does still have mild left quadrant abdominal tenderness, however, he is tolerating a regular 
diet.  He has had no more bleeding or complaints.

2.  Bright red blood per rectum.  Again, this is in the setting of colitis.  This has resolved.  His 
hemoglobin and hematocrit have remained stable.

3.  History of prostate cancer.  No intervention warranted.

4.  Leukocytosis.  This is in the setting of an acute response.  His white count is improving at disc
harge.

5.  Back pain:  The patient was treated during this hospitalization with a small dose of oxycodone IR
.  I am prescribing a 5 mg tablet of oxycodone IR first thing in the morning for the patient for his 
chronic back pain given his osteoarthritis.



DISPOSITION:  The patient will be discharged home independently with his daughter.  There are no pend
ing studies.



FOLLOWUP:  Will be with his primary care physician, Dr. Luther Bella as well as Dr. Milad Barnhart, h
is rheumatologist.



DISCHARGE MEDICATIONS:  Please refer to EMR form.  I have provided the patient a prescription for cip
rofloxacin as well as Flagyl and oxycodone IR.





Job #:  [id number]

## 2017-10-28 NOTE — ASDISCHSUM
----------------------------------------------

Discharge Information

----------------------------------------------

Plan Status:Home with No Needs                       Medically Cleared to Leave:

Discharge Date:10/28/2017 11:30 AM                   CM D/C Disposition:Home, Routine, Self-Care

ADT D/C Disposition:Home, Routine, Self-Care         Projected Discharge Date:10/28/2017 11:30 AM

Transportation at D/C:Family                         Discharge Delay Reason:

Follow-Up Date:10/28/2017 11:30 AM                   Discharge Slot:

Final Diagnosis:

----------------------------------------------

Placement Information

----------------------------------------------

----------------------------------------------

Patient Contact Information

----------------------------------------------

Contact Name:ORBIN                             Relationship:Daughter

Address:[address]                                Home [phone]

                                                     Work Phone:

City:[place]                                         Alternate Phone:

[facility]/Zip Code:[place] [address]                              Email:

----------------------------------------------

Financial Information

----------------------------------------------

Financial Class:

Primary Plan Desc:MEDICARE INPATIENT                 Primary Plan Number:[id number]

Secondary Plan Desc:[place]                          Secondary Plan Number:[id number]

 

 

----------------------------------------------

Assessment Information

----------------------------------------------

----------------------------------------------

[facility] CM Progress Note

----------------------------------------------

CM Note

 

CM Note                       

Notes:

Spoke w/NP, anticipate pt will dc home w/support of daughter, pt independent at baseline. CM 

available for any changes.

 

Date Signed:  10/28/2017 10:48 AM

Electronically Signed By:Bhumika Stanton RN

 

 

----------------------------------------------

Intervention Information

----------------------------------------------

Intervention Type:*Incorrect Registration            Date of Service:10/27/2017 06:47 PM

Patient Type:Observation                             Staff Member:KAYLIN Gudino Shelly

Hours:0.25                                           Discipline:

Severity:1 (0-1 Hours)                               Comment:Registered inpatient, admit order writ
ten 

                                                              observation status.

## 2017-10-28 NOTE — ASDISCHSUM
----------------------------------------------

Discharge Information

----------------------------------------------

Plan Status:Home with No Needs                       Medically Cleared to Leave:

Discharge Date:10/28/2017 11:30 AM                   CM D/C Disposition:Home, Routine, Self-Care

ADT D/C Disposition:Home, Routine, Self-Care         Projected Discharge Date:10/28/2017 11:30 AM

Transportation at D/C:Family                         Discharge Delay Reason:

Follow-Up Date:10/28/2017 11:30 AM                   Discharge Slot:

Final Diagnosis:

----------------------------------------------

Placement Information

----------------------------------------------

----------------------------------------------

Patient Contact Information

----------------------------------------------

Contact Name:ROBIN                             Relationship:Daughter

Address:[address]                                Home [phone]

                                                     Work Phone:

City:[place]                                         Alternate Phone:

[facility]/Zip Code:[place] [address]                              Email:

----------------------------------------------

Financial Information

----------------------------------------------

Financial Class:

Primary Plan Desc:MEDICARE INPATIENT                 Primary Plan Number:[id number]

Secondary Plan Desc:[place]                          Secondary Plan Number:[id number]

 

 

----------------------------------------------

Assessment Information

----------------------------------------------

----------------------------------------------

[facility] CM Progress Note

----------------------------------------------

CM Note

 

CM Note                       

Notes:

Spoke w/NP, anticipate pt will dc home w/support of daughter, pt independent at baseline. CM 

available for any changes.

 

Date Signed:  10/28/2017 10:48 AM

Electronically Signed By:Bhumika Stanton RN

 

 

----------------------------------------------

Intervention Information

----------------------------------------------

Intervention Type:*Incorrect Registration            Date of Service:10/27/2017 06:47 PM

Patient Type:Observation                             Staff Member:KAYLIN Gudino Shelly

Hours:0.25                                           Discipline:

Severity:1 (0-1 Hours)                               Comment:Registered inpatient, admit order writ
ten 

                                                              observation status.

## 2018-05-09 ENCOUNTER — HOSPITAL ENCOUNTER (OUTPATIENT)
Dept: HOSPITAL 80 - BMCIMAGING | Age: 83
End: 2018-05-09
Attending: INTERNAL MEDICINE
Payer: COMMERCIAL

## 2018-05-09 DIAGNOSIS — M25.551: Primary | ICD-10-CM

## 2018-05-09 DIAGNOSIS — M25.552: ICD-10-CM

## 2018-05-15 ENCOUNTER — HOSPITAL ENCOUNTER (EMERGENCY)
Dept: HOSPITAL 80 - FED | Age: 83
Discharge: HOME | End: 2018-05-15
Payer: COMMERCIAL

## 2018-05-15 VITALS — SYSTOLIC BLOOD PRESSURE: 162 MMHG | DIASTOLIC BLOOD PRESSURE: 84 MMHG

## 2018-05-15 DIAGNOSIS — R41.0: Primary | ICD-10-CM

## 2018-05-15 DIAGNOSIS — Z85.46: ICD-10-CM

## 2018-05-15 LAB — PLATELET # BLD: 233 10^3/UL (ref 150–400)

## 2018-05-15 NOTE — EDPHY
H & P


Stated Complaint: confusion


Time Seen by Provider: 05/15/18 17:13


HPI/ROS: 





CHIEF COMPLAINT:  Confusion





HISTORY OF PRESENT ILLNESS:  The patient presents to the ED with acute 

confusion.  He has had difficulty remembering places today.  This is quite 

atypical for the patient.  He denies any new medication changes.  He has remote 

history of treated prostate cancer.  The patient typically takes tramadol in 

the morning for chronic low back pain.  He has been on this medication for some 

time.  The patient denies any acute numbness or weakness.  The patient does 

complain of a mild headache.





REVIEW OF SYSTEMS:


A comprehensive 10 point review of systems is otherwise negative aside from 

elements mentioned in the history of present illness.


Source: Patient


Exam Limitations: No limitations





- Personal History


Current Tetanus/Diphtheria Vaccine: Unsure


Current Tetanus Diphtheria and Acellular Pertussis (TDAP): Unsure





- Medical/Surgical History


Hx Asthma: No


Hx Chronic Respiratory Disease: No


Hx Diabetes: No


Hx Cardiac Disease: No


Hx Renal Disease: No


Hx Cirrhosis: No


Hx Alcoholism: No


Hx HIV/AIDS: No


Hx Splenectomy or Spleen Trauma: No


Other PMH: back pain, osteoarthritis, falls, prostate CA,





- Social History


Smoking Status: Never smoked





- Physical Exam


Exam: 





General Appearance:  Alert, no distress


Eyes:  Pupils equal and round no pallor or injection


ENT, Mouth:  Mucous membranes moist


Respiratory:  There are no retractions, lungs are clear to auscultation


Cardiovascular:  Regular rate and rhythm


Gastrointestinal:  Abdomen is soft and nontender, no masses, bowel sounds normal


Neurological:  5/5 strength all 4 extremities, cranial nerves 2-12 grossly 

intact


Skin:  Warm and dry, no rashes


Musculoskeletal:  Neck is supple nontender


Extremities:  symmetrical, full range of motion








Constitutional: 


 Initial Vital Signs











Temperature (C)  36.5 C   05/15/18 17:03


 


Heart Rate  69   05/15/18 17:03


 


Respiratory Rate  16   05/15/18 17:03


 


Blood Pressure  169/76 H  05/15/18 17:03


 


O2 Sat (%)  94   05/15/18 17:03








 











O2 Delivery Mode               Room Air














Allergies/Adverse Reactions: 


 





No Known Allergies Allergy (Verified 05/15/18 17:02)


 








Home Medications: 














 Medication  Instructions  Recorded


 


Acetaminophen [Tylenol 325mg (*)] 650 mg PO Q6 PRN 10/27/17


 


Ranitidine HCl [Zantac 75] 75 mg PO BID PRN 10/27/17


 


traMADol [Ultram 50 mg (*)] 50 mg PO QID PRN 10/27/17














Medical Decision Making





- Diagnostics


Imaging Results: 


 Imaging Impressions





Brain MRI  05/15/18 17:36


Impression:  


1. Stable moderate cerebral atrophy.


2.  Minimal nonspecific hyperintense T2/FLAIR signal abnormalities in the white 

matter of frontal and parietal lobes similar to the prior study. Differential 

diagnosis includes microvascular ischemic disease, post-infectious/post-

inflammatory sequela, atypical demyelinating disease, or migraine-related 

sequela.


3. No acute infarct, hemorrhage, hydrocephalus, mass effect, or herniation.


 


If symptoms worsen, additional imaging may be necessary. 


 


Findings discussed with Rl Roche M.D.  at  19:14 hour, 5/15/2018.


 











ED Course/Re-evaluation: 





The patient presents to the ED for evaluation of transient confusion.  The 

patient reportedly had difficulty remembering where his family lives earlier 

today and also remembering where he typically [place] at.  The patient was noted 

to be neurologically intact upon arrival.  The patient's vital signs are 

stable.  He is afebrile.  The patient's urinalysis demonstrates no evidence of 

an infection.  The patient's laboratory studies demonstrate no significant 

metabolic derangement.





Given his unexplained neurologic symptoms an MRI of the brain was obtained 

which demonstrates no evidence of a CNS tumor or stroke.





The patient underwent serial examinations in the ED.  I re-evaluated the 

patient at 7:30 p.m. and reviewed his laboratory and imaging studies.





The patient's symptoms are not suggestive of a TIA.  I do feel that he can 

safely be discharged home with instructions to return to the ED for the 

development of any more significant neurologic symptoms.





The patient is also been referred to our on-call neurologist.








Differential Diagnosis: 





Differential diagnosis considered includes atypical migraine, dehydration, 

transient global amnesia, urinary tract infection, CNS tumor





- Data Points


Laboratory Results: 


 Laboratory Results





 05/15/18 17:20 





 05/15/18 17:20 





 











  05/15/18 05/15/18 05/15/18





  19:00 17:20 17:20


 


WBC      7.65 10^3/uL 10^3/uL





     (3.80-9.50) 


 


RBC      5.15 10^6/uL 10^6/uL





     (4.40-6.38) 


 


Hgb      16.5 g/dL g/dL





     (13.7-17.5) 


 


Hct      48.4 % %





     (40.0-51.0) 


 


MCV      94.0 fL fL





     (81.5-99.8) 


 


MCH      32.0 pg pg





     (27.9-34.1) 


 


MCHC      34.1 g/dL g/dL





     (32.4-36.7) 


 


RDW      13.5 % %





     (11.5-15.2) 


 


Plt Count      233 10^3/uL 10^3/uL





     (150-400) 


 


MPV      8.1 fL L fL





     (8.7-11.7) 


 


Neut % (Auto)      51.9 % %





     (39.3-74.2) 


 


Lymph % (Auto)      34.2 % %





     (15.0-45.0) 


 


Mono % (Auto)      10.2 % %





     (4.5-13.0) 


 


Eos % (Auto)      2.7 % %





     (0.6-7.6) 


 


Baso % (Auto)      0.7 % %





     (0.3-1.7) 


 


Nucleat RBC Rel Count      0.0 % %





     (0.0-0.2) 


 


Absolute Neuts (auto)      3.97 10^3/uL 10^3/uL





     (1.70-6.50) 


 


Absolute Lymphs (auto)      2.62 10^3/uL 10^3/uL





     (1.00-3.00) 


 


Absolute Monos (auto)      0.78 10^3/uL 10^3/uL





     (0.30-0.80) 


 


Absolute Eos (auto)      0.21 10^3/uL 10^3/uL





     (0.03-0.40) 


 


Absolute Basos (auto)      0.05 10^3/uL 10^3/uL





     (0.02-0.10) 


 


Absolute Nucleated RBC      0.00 10^3/uL 10^3/uL





     (0-0.01) 


 


Immature Gran %      0.3 % %





     (0.0-1.1) 


 


Immature Gran #      0.02 10^3/uL 10^3/uL





     (0.00-0.10) 


 


Sodium    141 mEq/L mEq/L  





    (135-145)  


 


Potassium    3.7 mEq/L mEq/L  





    (3.3-5.0)  


 


Chloride    102 mEq/L mEq/L  





    ()  


 


Carbon Dioxide    27 mEq/l mEq/l  





    (22-31)  


 


Anion Gap    12 mEq/L mEq/L  





    (8-16)  


 


BUN    14 mg/dL mg/dL  





    (7-23)  


 


Creatinine    0.9 mg/dL mg/dL  





    (0.7-1.3)  


 


Estimated GFR    > 60   





    


 


Glucose    80 mg/dL mg/dL  





    ()  


 


Calcium    8.8 mg/dL mg/dL  





    (8.5-10.4)  


 


Urine Color  YELLOW     





    


 


Urine Appearance  CLEAR     





    


 


Urine pH  6.0     





   (5.0-7.5)   


 


Ur Specific Gravity  1.013     





   (1.002-1.030)   


 


Urine Protein  NEGATIVE     





   (NEGATIVE)   


 


Urine Ketones  NEGATIVE     





   (NEGATIVE)   


 


Urine Blood  NEGATIVE     





   (NEGATIVE)   


 


Urine Nitrate  NEGATIVE     





   (NEGATIVE)   


 


Urine Bilirubin  NEGATIVE     





   (NEGATIVE)   


 


Urine Urobilinogen  NEGATIVE EU EU    





   (0.2-1.0)   


 


Ur Leukocyte Esterase  NEGATIVE     





   (NEGATIVE)   


 


Urine Glucose  NEGATIVE     





   (NEGATIVE)   














Departure





- Departure


Disposition: Home, Routine, Self-Care


Clinical Impression: 


 Confusion





Condition: Good


Instructions:  Altered Mental Status (ED)


Additional Instructions: 


1.  The testing done in the emergency department today demonstrates no evidence 

of a stroke, tumor, metabolic abnormality or infection.


2.  Please schedule a follow-up appointment with your primary care provider for 

a recheck.  You have also been given the contact number of the neurologist at 

the [facility].


3.  Please return to the ED for the development of any worsening neurologic 

symptoms, numbness, weakness, difficulty with speech or other concerns.  








Referrals: 


Luther Bella MD [Primary Care Provider] - As per Instructions


Brittny Singh DO [Non Staff and Non MD] - As per Instructions

## 2018-06-01 ENCOUNTER — HOSPITAL ENCOUNTER (OUTPATIENT)
Dept: HOSPITAL 80 - FCPNEURO | Age: 83
End: 2018-06-01
Attending: PSYCHIATRY & NEUROLOGY
Payer: COMMERCIAL

## 2018-06-01 DIAGNOSIS — R41.0: Primary | ICD-10-CM

## 2018-06-01 DIAGNOSIS — R94.01: ICD-10-CM

## 2018-06-01 NOTE — CPEEG
[f 
rep st]



                                                      ELECTROENCEPHALOGRAM





DATE OF STUDY:  



INTERPRETATION:  This EEG is abnormal due to the presence of right anterior 
temporal sharp waves.  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 
disorder. 



I spoke with patient's primary neurologist, Dr. Brittny Singh, regarding these 
findings on the date of interpretation. 



REPORT:  This EEG contains 10 Hz alpha activity to the posterior head regions.  
The primary feature of this study was the presence of right anterior temporal 
sharp waves, maximal at electrodes F8 and T4.  These continued through photic 
stimulation and hyperventilation.  The patient became drowsy and fell asleep 
during the study.  During drowsiness and sleep, the patient had continued 
activation of right anterior temporal sharp waves. 



I spoke with patient's primary neurologist, Dr. Brittny Singh, regarding these 
findings on the date of interpretation. 





Job #:  [id number]

MTDD

## 2018-07-03 ENCOUNTER — HOSPITAL ENCOUNTER (OUTPATIENT)
Dept: HOSPITAL 80 - FIMAGING | Age: 83
End: 2018-07-03
Attending: PSYCHIATRY & NEUROLOGY
Payer: COMMERCIAL

## 2018-07-03 DIAGNOSIS — R90.82: ICD-10-CM

## 2018-07-03 DIAGNOSIS — I65.23: Primary | ICD-10-CM

## 2018-07-03 DIAGNOSIS — G31.9: ICD-10-CM

## 2018-07-03 PROCEDURE — 70553 MRI BRAIN STEM W/O & W/DYE: CPT

## 2018-07-03 PROCEDURE — 70498 CT ANGIOGRAPHY NECK: CPT

## 2018-07-03 PROCEDURE — A9585 GADOBUTROL INJECTION: HCPCS

## 2018-07-03 PROCEDURE — 70496 CT ANGIOGRAPHY HEAD: CPT

## 2018-07-28 ENCOUNTER — HOSPITAL ENCOUNTER (OUTPATIENT)
Dept: HOSPITAL 80 - FIMAGING | Age: 83
End: 2018-07-28
Attending: NEUROLOGICAL SURGERY
Payer: COMMERCIAL

## 2018-07-28 DIAGNOSIS — M48.062: ICD-10-CM

## 2018-07-28 DIAGNOSIS — M51.36: Primary | ICD-10-CM

## 2019-03-22 ENCOUNTER — HOSPITAL ENCOUNTER (INPATIENT)
Dept: HOSPITAL 80 - FED | Age: 84
LOS: 2 days | Discharge: HOME | DRG: 193 | End: 2019-03-24
Attending: INTERNAL MEDICINE | Admitting: INTERNAL MEDICINE
Payer: COMMERCIAL

## 2019-03-22 DIAGNOSIS — G40.909: ICD-10-CM

## 2019-03-22 DIAGNOSIS — E87.1: ICD-10-CM

## 2019-03-22 DIAGNOSIS — G89.29: ICD-10-CM

## 2019-03-22 DIAGNOSIS — J96.01: ICD-10-CM

## 2019-03-22 DIAGNOSIS — J10.08: Primary | ICD-10-CM

## 2019-03-22 DIAGNOSIS — Z85.46: ICD-10-CM

## 2019-03-22 LAB
INR PPP: 1.23 (ref 0.83–1.16)
PLATELET # BLD: 520 10^3/UL (ref 150–400)
PROTHROMBIN TIME: 15 SEC (ref 12–15)

## 2019-03-22 PROCEDURE — G0378 HOSPITAL OBSERVATION PER HR: HCPCS

## 2019-03-22 RX ADMIN — GUAIFENESIN SCH MG: 600 TABLET, EXTENDED RELEASE ORAL at 20:29

## 2019-03-22 RX ADMIN — SODIUM CHLORIDE SCH MLS: 900 INJECTION, SOLUTION INTRAVENOUS at 18:42

## 2019-03-22 RX ADMIN — LATANOPROST SCH: 50 SOLUTION OPHTHALMIC at 22:48

## 2019-03-22 RX ADMIN — OSELTAMIVIR PHOSPHATE SCH MG: 75 CAPSULE ORAL at 20:29

## 2019-03-22 NOTE — HOSPPROG
Hospitalist Progress Note


Assessment/Plan: 





Case discussed with Lucy Acosta NP. Agree with plan outlined in her note with 

following exceptions:





Briefly, 87yo relatively healthy M presents with 2 weeks of worsening 

respiratory symptoms including productive cough and shortness of breath. No 

fevers. Non-smoker. No sick contacts. He has had a few loose stools but no n/v. 

No recent antibiotics. Denies rashes or joint pains. Had road tripped to 

[place] and [place] when symptoms started. Returned back to [place] about a 

week ago and symptoms have progressed. Went to urgent care today and was found 

to be hypoxic and referred to this ED. CXR shows multifocal pneumonia. CTA 

chest is negative for PE but does shows extensive multifocal infiltrates in 

bilateral upper and lower lobes. He was saturating 82% on room air and this 

improved to >90% with 2L supplemental oxygen. 





Exam: Non-toxic appearing. RRR. Lungs with diffuse rhonchi, no wheezes, no 

increased wob. Abdomen soft and nt. No leg edema. 





Labs: Notable for WBC of 13k with 80% PMNs, platelet count of 520, Na 131, Cr 

0.7, lactate of 1.7. A UA is unremarkable. 





ECG: NSR, normal axis, good R wave progression, <1mm ST elevation in V2-5 (old 

from prior in 2012), <1mm ST depression in I and aVL (old from prior), no new 

acute ischemic changes.





CXR: per HPI





Assessment/Plan:


87yo M here with multifocal pneumonia and hypoxemia. Suspect initially had 

viral infection and now with superimposed bacterial process such as Mycoplasma.





#Multifocal pneumonia: Will treat as CAP with ceftriaxone and azithromycin. 

Respiratory viral PCR and blood cultures pending. He was in [place] but no 

significant risk factors for fungal infection (coccidiomycosis). Legionella a 

possibility with loose stools and hyponatremia and will check for this.





#Acute hypoxia: He is not in distress. Wean as able. 





#Hyponatremia: Appears dry. IVF and recheck in AM.





#Leukocytosis: Due to infection. He is not septic. 





#Chronic low back pain: Continue tramadol, tylenol prn.





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 Followed by Dr Pillai.





#H/o prostate cancer s/p prostatectomy





VTE ppx: LMWH


Code: full


Diet: regular


Dispo: Admit under observation





Objective: 


 Vital Signs











Temp Pulse Resp BP Pulse Ox


 


 36.9 C   77   16   144/76 H  91 L


 


 03/22/19 16:00  03/22/19 16:00  03/22/19 16:00  03/22/19 16:00  03/22/19 16:00








 











PT  15.0 SEC (12.0-15.0)   03/22/19  13:45    


 


INR  1.23  (0.83-1.16)  H  03/22/19  13:45    














ICD10 Worksheet


Patient Problems: 


 Problems











Problem Status Onset


 


Pneumonia Acute  


 


Colitis Acute

## 2019-03-22 NOTE — CPEKG
Test Reason : OPEN

Blood Pressure : ***/*** mmHG

Vent. Rate : 079 BPM     Atrial Rate : 079 BPM

   P-R Int : 190 ms          QRS Dur : 097 ms

    QT Int : 409 ms       P-R-T Axes : -28 012 014 degrees

   QTc Int : 469 ms

 

Sinus rhythm

ST elevation, consider anterolateral injury

 

Confirmed by Lucy Aaron ([id number]) on 3/22/2019 5:05:59 PM

 

Referred By: Lucy Aaron           Confirmed By:Lucy Aaron

## 2019-03-22 NOTE — EDPHY
HPI/HX/ROS/PE/MDM


Narrative: 





CHIEF COMPLAINT:  Low oxygen, right posterior chest and shoulder pain, 

generally feeling poorly





HISTORY OF PRESENT ILLNESS:  This is an 88-year-old gentleman who presents 

after being seen at urgent care earlier today and noted to have an oxygen 

saturation of 83%.  Patient states that about 2 weeks ago he developed an upper 

respiratory infection.  At that time he was driving to [place].  He has "not 

been able to get rid of this cold" and has been in bed for the last 8 days.  

Patient returned from [place] 8 days ago.  He reports feeling short of breath 

and winded especially over the last 2 days.  He denies fever.  He has had a 

cough productive of yellowish sputum.  Patient denies any chest pain.  Reports 

decreased appetite but no nausea or vomiting.  No diarrhea.  He has had weight 

loss.  No ill contacts.  He has had diarrhea.





No fever, chills, palpitations, vomiting, urinary complaints, headache, 

lightheadedness.  





REVIEW OF SYSTEMS: 


A comprehensive 10 system review of systems was reviewed and is otherwise 

negative aside from elements mentioned in the history of present illness and 

medical decision making.





PAST MEDICAL HISTORY:  Osteoarthritis with chronic back pain.  Patient denies 

any history of cardiac issues, atrial fibrillation, COPD, PE or DVTs.





SOCIAL HISTORY:  Remote smoking history.  Here with his daughter.





VITAL SIGNS Reviewed by me.  O2 sat 82% on room air, afebrile.


GENERAL:  Well-developed, well-nourished, reports feeling short of breath.


HEENT:  Atraumatic.  Eyes:  No icterus, no injection. Mouth:  moist mucous 

membranes.  No erythema or lesions. Neck:  supple with no adenopathy.


LUNGS:  Wet rales and rhonchi right greater than left, diminished breath sounds 

at the left base.


CARDIAC:  Regular rate and rhythm, no rubs, murmurs or gallops.


ABDOMEN:  Soft, nontender, nondistended, bowel sounds normal.


BACK:  No CVA tenderness.


EXTREMITIES:  No trauma. No edema.  Range of motion is normal throughout.


NEURO:  Alert and oriented,  grossly nonfocal.


SKIN:  Warm and dry, no rash.


PSYCHIATRIC:  Normal mentation, no agitation.


ED Course: 





88-year-old male presenting with hypoxia and reports of upper respiratory 

symptoms.  Patient has not had a fever.  He is hypoxic on examination and has 

coarse rhonchi and rales throughout right greater than left.





IV was placed and patient received normal saline.


Lactic acid is 1.7.  White count [id number]. Chest x-ray demonstrates multi lobar 

pneumonia.  Levofloxacin 750 mg IV given.


Patient's D-dimer is elevated 2.31.


CT scan ordered for pulmonary embolism.  This demonstrates no PE but evidence 

of multi lobar pneumonia.





Sepsis Evaluation Note:


The patient presents to the ED with potential infection identified as 

pneumonia.  The patient did have evidence of sepsis with heart rate greater 

than 90, and WBC greater than 12,000.


Patient did not have evidence of severe sepsis (lactic acid greater than 2, INR 

greater than 1.5, platelets less than 100,000, bilirubin greater than 2, 

creatinine greater than 2, systolic blood pressure less than 90 or MAP less 

than 65, or the need for intubation or positive pressure ventilation).





Patient's course discussed with Dr. Jeffery.  Patient will be admitted to the 

hospital.


MDM: 





Differential diagnosis for the patient's shortness of breath was considered 

including but not limited to pulmonary infectious processes, COPD exacerbation,

  pulmonary emboli, pulmonary edema, congestive heart failure, and cardiac 

causes.





- Data Points


Imaging Results: 


 Imaging Impressions





Chest X-Ray  03/22/19 13:48


Impression: Airspace consolidation in the lower lungs bilaterally suspicious 

for multifocal pneumonia. Consider aspiration pneumonitis.








Chest/Thorax CTA  03/22/19 14:16


Impression:    Extensive multifocal airspace consolidation throughout both 

lungs compatible with pneumonia, without pulmonary embolus identified.


 


Results called to Dr. Aaron at 3:25 PM at the time of the interpretation. 











Laboratory Results: 


 Laboratory Results





 03/22/19 13:45 





 03/22/19 13:45 





 











  03/22/19 03/22/19 03/22/19





  13:52 13:46 13:45


 


WBC      





    


 


RBC      





    


 


Hgb      





    


 


Hct      





    


 


MCV      





    


 


MCH      





    


 


MCHC      





    


 


RDW      





    


 


Plt Count      





    


 


MPV      





    


 


Neut % (Auto)      





    


 


Lymph % (Auto)      





    


 


Mono % (Auto)      





    


 


Eos % (Auto)      





    


 


Baso % (Auto)      





    


 


Nucleat RBC Rel Count      





    


 


Absolute Neuts (auto)      





    


 


Absolute Lymphs (auto)      





    


 


Absolute Monos (auto)      





    


 


Absolute Eos (auto)      





    


 


Absolute Basos (auto)      





    


 


Absolute Nucleated RBC      





    


 


Immature Gran %      





    


 


Immature Gran #      





    


 


PT      





    


 


INR      





    


 


APTT      





    


 


D-Dimer      





    


 


VBG Lactic Acid    1.7 mmol/L mmol/L  





    (0.7-2.1)  


 


Sodium      131 mEq/L L mEq/L





     (135-145) 


 


Potassium      3.9 mEq/L mEq/L





     (3.5-5.2) 


 


Chloride      98 mEq/L mEq/L





     () 


 


Carbon Dioxide      23 mEq/l mEq/l





     (22-31) 


 


Anion Gap      10 mEq/L mEq/L





     (6-14) 


 


BUN      11 mg/dL mg/dL





     (7-23) 


 


Creatinine      0.7 mg/dL mg/dL





     (0.7-1.3) 


 


Estimated GFR      > 60 





    


 


Glucose      118 mg/dL H mg/dL





     () 


 


Calcium      8.0 mg/dL L mg/dL





     (8.5-10.4) 


 


Total Bilirubin      0.9 mg/dL mg/dL





     (0.1-1.4) 


 


POC Troponin I  0.01 ng/mL ng/mL    





   (0.00-0.08)   














  03/22/19 03/22/19





  13:45 13:45


 


WBC    13.51 10^3/uL H 10^3/uL





    (3.80-9.50) 


 


RBC    4.47 10^6/uL 10^6/uL





    (4.40-6.38) 


 


Hgb    14.6 g/dL g/dL





    (13.7-17.5) 


 


Hct    42.3 % %





    (40.0-51.0) 


 


MCV    94.6 fL fL





    (81.5-99.8) 


 


MCH    32.7 pg pg





    (27.9-34.1) 


 


MCHC    34.5 g/dL g/dL





    (32.4-36.7) 


 


RDW    13.6 % %





    (11.5-15.2) 


 


Plt Count    520 10^3/uL H 10^3/uL





    (150-400) 


 


MPV    8.0 fL L fL





    (8.7-11.7) 


 


Neut % (Auto)    80.4 % H %





    (39.3-74.2) 


 


Lymph % (Auto)    9.5 % L %





    (15.0-45.0) 


 


Mono % (Auto)    7.3 % %





    (4.5-13.0) 


 


Eos % (Auto)    1.8 % %





    (0.6-7.6) 


 


Baso % (Auto)    0.3 % %





    (0.3-1.7) 


 


Nucleat RBC Rel Count    0.0 % %





    (0.0-0.2) 


 


Absolute Neuts (auto)    10.87 10^3/uL H 10^3/uL





    (1.70-6.50) 


 


Absolute Lymphs (auto)    1.28 10^3/uL 10^3/uL





    (1.00-3.00) 


 


Absolute Monos (auto)    0.98 10^3/uL H 10^3/uL





    (0.30-0.80) 


 


Absolute Eos (auto)    0.24 10^3/uL 10^3/uL





    (0.03-0.40) 


 


Absolute Basos (auto)    0.04 10^3/uL 10^3/uL





    (0.02-0.10) 


 


Absolute Nucleated RBC    0.00 10^3/uL 10^3/uL





    (0-0.01) 


 


Immature Gran %    0.7 % %





    (0.0-1.1) 


 


Immature Gran #    0.10 10^3/uL 10^3/uL





    (0.00-0.10) 


 


PT  15.0 SEC SEC  





   (12.0-15.0)  


 


INR  1.23  H   





   (0.83-1.16)  


 


APTT  34.3 SEC SEC  





   (23.0-38.0)  


 


D-Dimer  2.31 ug/mLFEU H ug/mLFEU  





   (0.00-0.50)  


 


VBG Lactic Acid    





   


 


Sodium    





   


 


Potassium    





   


 


Chloride    





   


 


Carbon Dioxide    





   


 


Anion Gap    





   


 


BUN    





   


 


Creatinine    





   


 


Estimated GFR    





   


 


Glucose    





   


 


Calcium    





   


 


Total Bilirubin    





   


 


POC Troponin I    





   











Medications Given: 


 





Levofloxacin/Dextrose (Levaquin 750 Mg (Premix))  150 mls @ 100 mls/hr IV EDNOW 

ONE


   PRN Reason: Protocol


   Stop: 03/22/19 15:57


   Last Admin: 03/22/19 15:24 Dose:  150 mls





Point of Care Test Results: 


 Chemistry











  03/22/19





  13:52


 


POC Troponin I  0.01 ng/mL ng/mL





   (0.00-0.08) 














General


Time Seen by Provider: 03/22/19 13:32


Initial Vital Signs: 


 Initial Vital Signs











Temperature (C)  37.1 C   03/22/19 13:23


 


Heart Rate  84   03/22/19 13:23


 


Respiratory Rate  18   03/22/19 13:23


 


Blood Pressure  151/78 H  03/22/19 13:23


 


O2 Sat (%)  82 L  03/22/19 13:23








 











O2 Delivery Mode               Nasal Cannula


 


O2 (L/minute)                  2














Allergies/Adverse Reactions: 


 





No Known Allergies Allergy (Verified 03/22/19 13:18)


 








Home Medications: 














 Medication  Instructions  Recorded


 


Acetaminophen [Tylenol 325mg (*)] 650 mg PO Q6 PRN 10/27/17


 


traMADol [Ultram 50 mg (*)] 50 mg PO Q6H PRN 10/27/17


 


Latanoprost 0.005% [Xalatan 0.005% 1 drops LEFTEYE DAILY 03/22/19





(*)]  


 


Timolol 0.5% [TIMOPTIC 0.5% (*)] 1 drops EACHEYE DAILY 03/22/19














Departure





- Departure


Disposition: [facility] Inpatient Acute


Clinical Impression: 


Pneumonia


Qualifiers:


 Pneumonia type: due to unspecified organism Laterality: bilateral Lung location

: unspecified part of lung Qualified Code(s): J18.9 - Pneumonia, unspecified 

organism





Condition: Fair

## 2019-03-22 NOTE — PDGENHP
<Soumya Acosta - Last Filed: 03/22/19 17:12>





History and Physical





- Chief Complaint


Productive cough, hypoxia





- History of Present Illness


89 y/o male w/hx of chronic back pain presents to ED from urgent care after 

being identified as hypoxic on RA 83%.  He reports approximately 2 weeks ago, 

he had an URI and around the same time drove himself to [place] for a 

conference.  He returned a little over a week ago but has not been able to get 

rid of his cold-like symptoms.  He endorses productive cough w/yellow sputum, 

decreased appetite, SOB, mild diarrhea, right lateral chest wall pain from 

coughing.  Report 9 lbs weight loss within 2 weeks because of his poor 

appetite.  Denies CP, palpitations, nausea, vomiting.


CXR/ Chest CTA reveals no PE but evidence of multilobar pneumonia.





He is being admitted for treatment and monitoring. 





History Information





- Allergies/Home Medication List


Allergies/Adverse Reactions: 








No Known Allergies Allergy (Verified 03/22/19 13:18)


 





Home Medications: 








Acetaminophen [Tylenol 325mg (*)] 650 mg PO Q6 PRN 10/27/17 [Last Taken 03/21/19

]


traMADol [Ultram 50 mg (*)] 50 mg PO Q6H PRN 10/27/17 [Last Taken 03/21/19]


Latanoprost 0.005% [Xalatan 0.005% (*)] 1 drops LEFTEYE DAILY 03/22/19 [Last 

Taken Unknown]


Timolol 0.5% [TIMOPTIC 0.5% (*)] 1 drops EACHEYE DAILY 03/22/19 [Last Taken 

Unknown]





I have personally reviewed and updated: family history, medical history, social 

history, surgical history





- Past Medical History


arthritis, GERD


Additional medical history: Chronic back pain.  Prostate CA





- Surgical History


Additional surgical history: Prostatectomy





- Family History


Positive for: non-pertinent





- Social History


Smoking Status: Never smoked


Alcohol Use: Rarely


Drug Use: None


Additional social history: Lives independently.  Retired, was a [id number] 







Review of Systems


Review of Systems: 





ROS: 10pt was reviewed & negative except for what was stated in HPI & below





Physical Exam


Physical Exam: 


Lab data and imaging were reviewed.





WBC: 13.51


INR: 1.23


D-Dimer: 2.31


Lactic Acid: 1.7


Na: 131


BUN/Cr: 11/0.7





CXR/Chest CTA: see HPI














Temp Pulse Resp BP Pulse Ox


 


 36.9 C   77   16   144/76 H  91 L


 


 03/22/19 16:00  03/22/19 16:00  03/22/19 16:00  03/22/19 16:00  03/22/19 16:00




















O2 (L/minute)                  3














Constitutional: uncomfortable (Mildly distressed pleasant cooperative pt)


Eyes: PERRL, anicteric sclera, EOMI


Ears, Nose, Mouth, Throat: hearing normal, ears appear normal, no oral mucosal 

ulcers, dry mucous membranes


Cardiovascular: regular rate and rhythym, no murmur, rub, or gallop, No edema


Peripheral Pulses: 2+: dorsalis-pedis (R) (Radial 2+), dorsalis-pedis (L) (

Radial 2+)


Respiratory: rhonchi


Gastrointestinal: normoactive bowel sounds, soft, non-tender abdomen, no 

palpable masses


Genitourinary: no bladder fullness, no bladder tenderness


Skin: warm, normal color, no rashes or abrasions, no fluctuance, no induration, 

No mottled


Musculoskeletal: full muscle strength, no muscle tenderness, normal joint ROM, 

no joint effusions


Neurologic: AAOx3, sensation intact bilaterally, CN II-XII Intact


Psychiatric: interacting appropriately, not anxious, not encephalopathic, 

thought process linear


Lymph, Heme, Immunologic: no cervical LAD, no supraclavicular LAD





Lab Data & Imaging Review





 03/22/19 13:45





 03/22/19 13:45














WBC  13.51 10^3/uL (3.80-9.50)  H  03/22/19  13:45    


 


RBC  4.47 10^6/uL (4.40-6.38)   03/22/19  13:45    


 


Hgb  14.6 g/dL (13.7-17.5)   03/22/19  13:45    


 


Hct  42.3 % (40.0-51.0)   03/22/19  13:45    


 


MCV  94.6 fL (81.5-99.8)   03/22/19  13:45    


 


MCH  32.7 pg (27.9-34.1)   03/22/19  13:45    


 


MCHC  34.5 g/dL (32.4-36.7)   03/22/19  13:45    


 


RDW  13.6 % (11.5-15.2)   03/22/19  13:45    


 


Plt Count  520 10^3/uL (150-400)  H  03/22/19  13:45    


 


MPV  8.0 fL (8.7-11.7)  L  03/22/19  13:45    


 


Neut % (Auto)  80.4 % (39.3-74.2)  H  03/22/19  13:45    


 


Lymph % (Auto)  9.5 % (15.0-45.0)  L  03/22/19  13:45    


 


Mono % (Auto)  7.3 % (4.5-13.0)   03/22/19  13:45    


 


Eos % (Auto)  1.8 % (0.6-7.6)   03/22/19  13:45    


 


Baso % (Auto)  0.3 % (0.3-1.7)   03/22/19  13:45    


 


Nucleat RBC Rel Count  0.0 % (0.0-0.2)   03/22/19  13:45    


 


Absolute Neuts (auto)  10.87 10^3/uL (1.70-6.50)  H  03/22/19  13:45    


 


Absolute Lymphs (auto)  1.28 10^3/uL (1.00-3.00)   03/22/19  13:45    


 


Absolute Monos (auto)  0.98 10^3/uL (0.30-0.80)  H  03/22/19  13:45    


 


Absolute Eos (auto)  0.24 10^3/uL (0.03-0.40)   03/22/19  13:45    


 


Absolute Basos (auto)  0.04 10^3/uL (0.02-0.10)   03/22/19  13:45    


 


Absolute Nucleated RBC  0.00 10^3/uL (0-0.01)   03/22/19  13:45    


 


Immature Gran %  0.7 % (0.0-1.1)   03/22/19  13:45    


 


Immature Gran #  0.10 10^3/uL (0.00-0.10)   03/22/19  13:45    


 


PT  15.0 SEC (12.0-15.0)   03/22/19  13:45    


 


INR  1.23  (0.83-1.16)  H  03/22/19  13:45    


 


APTT  34.3 SEC (23.0-38.0)   03/22/19  13:45    


 


D-Dimer  2.31 ug/mLFEU (0.00-0.50)  H  03/22/19  13:45    


 


VBG Lactic Acid  1.7 mmol/L (0.7-2.1)   03/22/19  13:46    


 


Sodium  131 mEq/L (135-145)  L  03/22/19  13:45    


 


Potassium  3.9 mEq/L (3.5-5.2)   03/22/19  13:45    


 


Chloride  98 mEq/L ()   03/22/19  13:45    


 


Carbon Dioxide  23 mEq/l (22-31)   03/22/19  13:45    


 


Anion Gap  10 mEq/L (6-14)   03/22/19  13:45    


 


BUN  11 mg/dL (7-23)   03/22/19  13:45    


 


Creatinine  0.7 mg/dL (0.7-1.3)   03/22/19  13:45    


 


Estimated GFR  > 60   03/22/19  13:45    


 


Glucose  118 mg/dL ()  H  03/22/19  13:45    


 


Calcium  8.0 mg/dL (8.5-10.4)  L  03/22/19  13:45    


 


Total Bilirubin  0.9 mg/dL (0.1-1.4)   03/22/19  13:45    


 


POC Troponin I  0.01 ng/mL (0.00-0.08)   03/22/19  13:52    


 


Urine Color  YELLOW   03/22/19  15:40    


 


Urine Appearance  CLEAR   03/22/19  15:40    


 


Urine pH  6.0  (5.0-7.5)   03/22/19  15:40    


 


Ur Specific Gravity  1.019  (1.002-1.030)   03/22/19  15:40    


 


Urine Protein  NEGATIVE  (NEGATIVE)   03/22/19  15:40    


 


Urine Ketones  NEGATIVE  (NEGATIVE)   03/22/19  15:40    


 


Urine Blood  NEGATIVE  (NEGATIVE)   03/22/19  15:40    


 


Urine Nitrate  NEGATIVE  (NEGATIVE)   03/22/19  15:40    


 


Urine Bilirubin  NEGATIVE  (NEGATIVE)   03/22/19  15:40    


 


Urine Urobilinogen  2.0 EU (0.2-1.0)  H  03/22/19  15:40    


 


Ur Leukocyte Esterase  NEGATIVE  (NEGATIVE)   03/22/19  15:40    


 


Urine Glucose  NEGATIVE  (NEGATIVE)   03/22/19  15:40    











Assessment & Plan


Plan: 





89 y/o relatively healthy male w/ no significant medical history presents 

hypoxic on room air and productive cough, subsequently found to have multilobar 

pneumonia.  Hemodynamically stable.  Vitals: 143/87 BP, 78 HR, 19 resp, 36.7c, 

93% 2L NC 





#Community acquired pneumonia: Lactic acid 1.7, white count 13.51, afebrile.  

Received Levaquin in ED.


-Respiratory pathogen panel pending; blood cultures pending


-Azithromycin + Ceftriaxone in AM


-CBC/CMP in AM


-Tessalon PRN for cough


-Incentive spirometry


-Daily weight check; reports weight loss of 9 lbs over 2 weeks d/t poor PO 

intake





#Acute hypoxic respiratory failure: see above.





#Chronic back pain: no trauma to the area.  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 

and is normal "wear and tear"


-Cont home tramadol + tylenol PRN


-OT to evaluate and treat





#Hypovolemic hyponatremia: d/t decreased PO intake


-IVF x 2 bags; will recheck CBC in AM





Diet: Regular


Code: Full


VTE ppx: Lovenox subq


Dispo: Admit to inpatient








<Nahid Jeffery - Last Filed: 03/22/19 17:19>





History and Physical





- History of Present Illness








Review of Systems


Review of Systems: 








Physical Exam


Physical Exam: 

















Temp Pulse Resp BP Pulse Ox


 


 36.9 C   77   16   144/76 H  91 L


 


 03/22/19 16:00  03/22/19 16:00  03/22/19 16:00  03/22/19 16:00  03/22/19 16:00




















O2 (L/minute)                  3

















Lab Data & Imaging Review





 03/22/19 13:45





 03/22/19 13:45














WBC  13.51 10^3/uL (3.80-9.50)  H  03/22/19  13:45    


 


RBC  4.47 10^6/uL (4.40-6.38)   03/22/19  13:45    


 


Hgb  14.6 g/dL (13.7-17.5)   03/22/19  13:45    


 


Hct  42.3 % (40.0-51.0)   03/22/19  13:45    


 


MCV  94.6 fL (81.5-99.8)   03/22/19  13:45    


 


MCH  32.7 pg (27.9-34.1)   03/22/19  13:45    


 


MCHC  34.5 g/dL (32.4-36.7)   03/22/19  13:45    


 


RDW  13.6 % (11.5-15.2)   03/22/19  13:45    


 


Plt Count  520 10^3/uL (150-400)  H  03/22/19  13:45    


 


MPV  8.0 fL (8.7-11.7)  L  03/22/19  13:45    


 


Neut % (Auto)  80.4 % (39.3-74.2)  H  03/22/19  13:45    


 


Lymph % (Auto)  9.5 % (15.0-45.0)  L  03/22/19  13:45    


 


Mono % (Auto)  7.3 % (4.5-13.0)   03/22/19  13:45    


 


Eos % (Auto)  1.8 % (0.6-7.6)   03/22/19  13:45    


 


Baso % (Auto)  0.3 % (0.3-1.7)   03/22/19  13:45    


 


Nucleat RBC Rel Count  0.0 % (0.0-0.2)   03/22/19  13:45    


 


Absolute Neuts (auto)  10.87 10^3/uL (1.70-6.50)  H  03/22/19  13:45    


 


Absolute Lymphs (auto)  1.28 10^3/uL (1.00-3.00)   03/22/19  13:45    


 


Absolute Monos (auto)  0.98 10^3/uL (0.30-0.80)  H  03/22/19  13:45    


 


Absolute Eos (auto)  0.24 10^3/uL (0.03-0.40)   03/22/19  13:45    


 


Absolute Basos (auto)  0.04 10^3/uL (0.02-0.10)   03/22/19  13:45    


 


Absolute Nucleated RBC  0.00 10^3/uL (0-0.01)   03/22/19  13:45    


 


Immature Gran %  0.7 % (0.0-1.1)   03/22/19  13:45    


 


Immature Gran #  0.10 10^3/uL (0.00-0.10)   03/22/19  13:45    


 


PT  15.0 SEC (12.0-15.0)   03/22/19  13:45    


 


INR  1.23  (0.83-1.16)  H  03/22/19  13:45    


 


APTT  34.3 SEC (23.0-38.0)   03/22/19  13:45    


 


D-Dimer  2.31 ug/mLFEU (0.00-0.50)  H  03/22/19  13:45    


 


VBG Lactic Acid  1.7 mmol/L (0.7-2.1)   03/22/19  13:46    


 


Sodium  131 mEq/L (135-145)  L  03/22/19  13:45    


 


Potassium  3.9 mEq/L (3.5-5.2)   03/22/19  13:45    


 


Chloride  98 mEq/L ()   03/22/19  13:45    


 


Carbon Dioxide  23 mEq/l (22-31)   03/22/19  13:45    


 


Anion Gap  10 mEq/L (6-14)   03/22/19  13:45    


 


BUN  11 mg/dL (7-23)   03/22/19  13:45    


 


Creatinine  0.7 mg/dL (0.7-1.3)   03/22/19  13:45    


 


Estimated GFR  > 60   03/22/19  13:45    


 


Glucose  118 mg/dL ()  H  03/22/19  13:45    


 


Calcium  8.0 mg/dL (8.5-10.4)  L  03/22/19  13:45    


 


Total Bilirubin  0.9 mg/dL (0.1-1.4)   03/22/19  13:45    


 


POC Troponin I  0.01 ng/mL (0.00-0.08)   03/22/19  13:52    


 


Urine Color  YELLOW   03/22/19  15:40    


 


Urine Appearance  CLEAR   03/22/19  15:40    


 


Urine pH  6.0  (5.0-7.5)   03/22/19  15:40    


 


Ur Specific Gravity  1.019  (1.002-1.030)   03/22/19  15:40    


 


Urine Protein  NEGATIVE  (NEGATIVE)   03/22/19  15:40    


 


Urine Ketones  NEGATIVE  (NEGATIVE)   03/22/19  15:40    


 


Urine Blood  NEGATIVE  (NEGATIVE)   03/22/19  15:40    


 


Urine Nitrate  NEGATIVE  (NEGATIVE)   03/22/19  15:40    


 


Urine Bilirubin  NEGATIVE  (NEGATIVE)   03/22/19  15:40    


 


Urine Urobilinogen  2.0 EU (0.2-1.0)  H  03/22/19  15:40    


 


Ur Leukocyte Esterase  NEGATIVE  (NEGATIVE)   03/22/19  15:40    


 


Urine Glucose  NEGATIVE  (NEGATIVE)   03/22/19  15:40    











Assessment & Plan


Assessment: 








Pneumonia (Acute)








Plan: 





Chart reviewed, patient personally examined, and case discussed with Lucy Acosta 

NP. Agree with her plan outlined above. Please see my separate note for 

additional details.

## 2019-03-23 LAB — PLATELET # BLD: 460 10^3/UL (ref 150–400)

## 2019-03-23 RX ADMIN — GUAIFENESIN SCH MG: 600 TABLET, EXTENDED RELEASE ORAL at 19:58

## 2019-03-23 RX ADMIN — ACETAMINOPHEN PRN MG: 325 TABLET ORAL at 15:09

## 2019-03-23 RX ADMIN — OSELTAMIVIR PHOSPHATE SCH MG: 75 CAPSULE ORAL at 17:43

## 2019-03-23 RX ADMIN — ACETAMINOPHEN PRN MG: 325 TABLET ORAL at 09:40

## 2019-03-23 RX ADMIN — LATANOPROST SCH: 50 SOLUTION OPHTHALMIC at 21:04

## 2019-03-23 RX ADMIN — ACETAMINOPHEN PRN MG: 325 TABLET ORAL at 19:57

## 2019-03-23 RX ADMIN — ENOXAPARIN SODIUM SCH MG: 100 INJECTION SUBCUTANEOUS at 08:45

## 2019-03-23 RX ADMIN — AZITHROMYCIN MONOHYDRATE SCH MLS: 500 INJECTION, POWDER, LYOPHILIZED, FOR SOLUTION INTRAVENOUS at 09:30

## 2019-03-23 RX ADMIN — OSELTAMIVIR PHOSPHATE SCH MG: 75 CAPSULE ORAL at 08:43

## 2019-03-23 RX ADMIN — GUAIFENESIN SCH MG: 600 TABLET, EXTENDED RELEASE ORAL at 08:43

## 2019-03-23 RX ADMIN — IPRATROPIUM BROMIDE AND ALBUTEROL SULFATE SCH ML: .5; 3 SOLUTION RESPIRATORY (INHALATION) at 16:09

## 2019-03-23 RX ADMIN — TIMOLOL MALEATE SCH: 5 SOLUTION OPHTHALMIC at 09:43

## 2019-03-23 RX ADMIN — IPRATROPIUM BROMIDE AND ALBUTEROL SULFATE SCH ML: .5; 3 SOLUTION RESPIRATORY (INHALATION) at 21:34

## 2019-03-23 RX ADMIN — SODIUM CHLORIDE SCH MLS: 900 INJECTION, SOLUTION INTRAVENOUS at 06:04

## 2019-03-23 NOTE — HOSPPROG
Hospitalist Progress Note


Assessment/Plan: 





Subjective


Follow-up on influenza a and respiratory failure.  No acute events overnight.  

Patient states he is feeling better and his congestion is getting to be less 

and less.





Objective


Vital signs as detailed below


Physical exam


General-awake alert conversant no acute distress


Heart-regular rate and rhythm no murmurs


Lungs-Clear to auscultation except for mild crackles at lung bases with normal 

respiratory effort, no significant wheezing


Abdomen-soft nontender nondistended normal bowel sounds


-no Lock catheter in place


Extremities-no significant pitting edema or calf pain with palpation


Skin-no concerning skin rashes noted





Labs as detailed below.





Assessment and plan





Pneumonia-patient did test positive for influenza A but may have a concurrent 

secondary bacterial pneumonia as well.  Continue course of Tamiflu as well as 

azithromycin and ceftriaxone.  





Acute hypoxic respiratory failure-add DuoNeb nebulizers 3 times a day and 

reassess tomorrow.  Continue to wean oxygen as able.





Hyponatremia-suspect secondary to hypovolemia.  Improving from 131-135.





Leukocytosis-improving from 13-8.





History of seizure disorder-no current medical therapy.  Patient is followed by 

Dr. Pillai as an outpatient.





History of prostate cancer-





Chronic low back pain-tramadol as needed.





DVT prophylaxis-Lovenox.





Disposition-he seems to be doing rather well and I would his beta could return 

home once medically clear.


Objective: 


 Vital Signs











Temp Pulse Resp BP Pulse Ox


 


 36.6 C   77   14   150/76 H  94 


 


 03/23/19 15:31  03/23/19 16:10  03/23/19 16:10  03/23/19 15:31  03/23/19 16:10








 Microbiology











 03/22/19 16:05 Respiratory Panel (PCR) - Final





 Nasal, Sinus - [place] Viral Transport    Influenza Virus Type A H3








 Laboratory Results





 03/23/19 04:53 





 03/23/19 04:53 





 











 03/22/19 03/23/19 03/24/19





 05:59 05:59 05:59


 


Intake Total  1182 663


 


Output Total  1650 


 


Balance  -468 663








 











PT  15.0 SEC (12.0-15.0)   03/22/19  13:45    


 


INR  1.23  (0.83-1.16)  H  03/22/19  13:45    














ICD10 Worksheet


Patient Problems: 


 Problems











Problem Status Onset


 


Pneumonia Acute  


 


Colitis Acute

## 2019-03-23 NOTE — ASMTCMCOM
CM Note

 

CM Note                       

Notes:

Patient admitted w cough, hypoxia. Labs/imaging show + influenza and PNA. He is being treated 

accordingly.



Patient is normally independent. He lives alone and has a daughter in the area. PT/OT have been 

ordered. No d/c needs anticipated, but CM available if this changes.



Current CM Discharge plan: independent

 

Date Signed:  03/23/2019 10:40 AM

Electronically Signed By:Jo Ann Girard RN

## 2019-03-23 NOTE — PDMN
Medical Necessity


Medical necessity: Change to inpt as of 3/23/19 @ 17:48, meets inpt criteria 

per MD order and M-282, Pneumonia. 89 y/o admitted from urgent care with hypoxia

, 83% on RA found to have multilobar pneumonia. Upgraded to inpt for ongoing 

AHRF, still requiring 2-3 L O2 to keep sats>90, add DuoNebs 3x/d,  pneumonia, +

influenza, may have a concurrent secondary bacterial pneumonia as well, cont IVF

, IV ABX's and sup care, est LOS>2MN for ongoing management of above.

## 2019-03-24 VITALS — DIASTOLIC BLOOD PRESSURE: 66 MMHG | SYSTOLIC BLOOD PRESSURE: 122 MMHG

## 2019-03-24 LAB — PLATELET # BLD: 439 10^3/UL (ref 150–400)

## 2019-03-24 RX ADMIN — OSELTAMIVIR PHOSPHATE SCH MG: 75 CAPSULE ORAL at 08:16

## 2019-03-24 RX ADMIN — ACETAMINOPHEN PRN MG: 325 TABLET ORAL at 12:34

## 2019-03-24 RX ADMIN — TIMOLOL MALEATE SCH DROP: 5 SOLUTION OPHTHALMIC at 12:04

## 2019-03-24 RX ADMIN — AZITHROMYCIN MONOHYDRATE SCH MLS: 500 INJECTION, POWDER, LYOPHILIZED, FOR SOLUTION INTRAVENOUS at 09:28

## 2019-03-24 RX ADMIN — IPRATROPIUM BROMIDE AND ALBUTEROL SULFATE SCH ML: .5; 3 SOLUTION RESPIRATORY (INHALATION) at 05:33

## 2019-03-24 RX ADMIN — ENOXAPARIN SODIUM SCH MG: 100 INJECTION SUBCUTANEOUS at 08:17

## 2019-03-24 RX ADMIN — ACETAMINOPHEN PRN MG: 325 TABLET ORAL at 04:43

## 2019-03-24 RX ADMIN — GUAIFENESIN SCH MG: 600 TABLET, EXTENDED RELEASE ORAL at 08:16

## 2019-03-24 NOTE — ASMTLACE
LACE

 

Length of stay for            Answers:  3 days                                

current admission                                                             

Acuity / Level of             Answers:  Yes                                   

Care: Did the patient                                                         

have an inpatient                                                             

admission?                                                                    

Comorbidities - select        Answers:  Any tumor (including                  

all that apply                          lymphoma or leukemia)                 

                                        Other                         Notes:  GERD; chronic back pain


# of Emergency department     Answers:  1-2                                   

visits in the last 6                                                          

months                                                                        

Score: 10

 

Date Signed:  03/24/2019 02:58 PM

Electronically Signed By:NIKOLAI Salas

## 2019-03-24 NOTE — PDHOMEO2F
Home Oxygen Face to Face


Home Orders: 


I certify that a physician or a nurse practitioner or physician's assistant has 

had a face-to-face encounter with this patient on the date of this order due to 

the diagnosis listed, which relates to the primary reason the patient requires 

home oxygen. Alternative treatments have been tried, or considered, and deemed 

ineffective. It is anticipated that supplemental oxygen will result in 

improvement with treatment.





Home oxygen qualifying diagnosis: hypoxia, influenza


SpO2 on room air (%): 87


Frequency of home oxygen needed: continuous


Home oxygen liters per minute: 2


Home oxygen delivery device: nasal cannula


Concentrator: No


E-tanks for mobility and back up: No


I certify that, based on these findings, the home oxygen is medically necessary 

for this patient for the following length of time.





Length of time home oxygen needed: 1 month

## 2019-03-24 NOTE — GDS
[f rep st]



                                                             DISCHARGE SUMMARY





DISCHARGE DIAGNOSES:  

1.  Acute hypoxic respiratory failure. 

2.  Influenza A. 

3.  Possible secondary bacterial pneumonia.



HISTORY OF PRESENT ILLNESS:  The patient is a pleasant and high functioning 88-year-old gentleman who
 presented to [facility] after being found hypoxic at a local urgent care.  He had re
cently been on a road trip, so consideration was also given for the possibility of a pulmonary emboli
sm.  A CT angiography of the chest was performed and it did not show any evidence of a pulmonary embo
lism, but there was extensive multifocal airspace consolidation throughout both lungs compatible with
 pneumonia.  A respiratory pathogen panel was obtained which was positive for influenza A.  Patient w
as started on Tamiflu and was also started on antibacterial therapy as well.  Fortunately, he improve
d rather quickly, but was still needing oxygen at the time of discharge to maintain normal oxygen sat
urations.  He will discharge to complete a course of Tamiflu, as well as azithromycin and will be dis
charged with continuous home oxygen until he can be reassessed by Dr. Bella.



HOSPITAL COURSE BY PROBLEM:  

1.  Pneumonia:  Positive for influenza A with possible secondary bacterial pneumonia.  I recommend th
at he continue with Tamiflu for 3 additional days.  I also recommend 3 additional days of azithromyci
n.  He was discharged with a prescription for Combivent, as well for any wheezing or shortness of dana
ath. 

2.  Acute hypoxic respiratory failure:  Patient currently in a stable state with 2 L of oxygen.  Like
ly resulting from underlying pneumonia.  I anticipate he could wean off oxygen in the coming 1 to 2 w
eeks as he recovers from his pneumonia.  I have recommended a followup visit with Dr. Bella for elzbieta
ssessment. 

3.  Hyponatremia:  Likely hypovolemic related.  Improved with IV fluids and 133 at the time of discha
rge. 

4.  Leukocytosis:  Resolved. 

5.  History of seizures:  Previously on medical therapy, but not at the current time.  He is followed
 by Dr. Pillai locally. 

6.  History of prostate cancer: 

7.  History of low-back pain:  No changes were made to pain medications here in the hospital. 

8.  DVT prophylaxis:  Lovenox used during this hospitalization.



DISPOSITION:  Patient appears stable for discharge home.  He does have family support locally with hi
s daughters.



PHYSICAL EXAM:  VITAL SIGNS:  Temperature 37.1, blood pressure 122/66, heart rate 73, respirations 17
, saturating 92% on 2 L.  GENERAL:  Patient appears comfortable he is awake, alert, conversant, no ac
[place] distress.  HEART:  Regular rate and rhythm.  No murmurs.  LUNGS:  No significant wheezing.  Respi
ratory effort normal.  Slight crackles at both lung bases.  ABDOMEN:  Soft, nontender, nondistended. 
 :  No Lock catheter in place.  EXTREMITIES:  No significant pitting edema or calf pain.



NOTABLE STUDIES:  As detailed in the HPI.



DISCHARGE MEDICATIONS:  

1.  Latanoprost eye drops. 

2.  Timolol eye drops. 

3.  Tramadol as needed. 

4.  Azithromycin 250 mg daily for 3 additional days. 

5.  Tamiflu 75 mg twice a day for 3 additional days. 

6.  Combivent inhaler 4 times a day as needed for wheezing or shortness of breath.



DISCHARGE INSTRUCTIONS:  I recommend a followup visit with Dr. Luther Bella, in 1 to 2 weeks' time to
 reassess and re-determine the need for home oxygen therapy.





Job #:  [id number]

## 2019-04-08 ENCOUNTER — HOSPITAL ENCOUNTER (OUTPATIENT)
Dept: HOSPITAL 80 - FIMAGING | Age: 84
End: 2019-04-08
Attending: INTERNAL MEDICINE
Payer: COMMERCIAL

## 2019-04-08 DIAGNOSIS — R22.2: ICD-10-CM

## 2019-04-08 DIAGNOSIS — K76.89: ICD-10-CM

## 2019-04-08 DIAGNOSIS — R91.8: Primary | ICD-10-CM

## 2019-04-08 DIAGNOSIS — N28.1: ICD-10-CM

## 2019-04-08 PROCEDURE — 74177 CT ABD & PELVIS W/CONTRAST: CPT

## 2020-12-02 NOTE — EDPHY
H & P


Stated Complaint: llq abd pain/rectal bleeding


HPI/ROS: 





HPI





CHIEF COMPLAINT:  Left lower quadrant abdominal pain,  bright red blood per 

rectum.





HISTORY OF PRESENT ILLNESS:  Patient is a 87-year-old male, history of prostate 

cancer, presents emergency room bright red blood per rectum x1 day.  States 8-9 

episodes of bright red blood per rectum.  This started around 9:00 p.m. last 

night.  Patient reports that last night at 9:00 p.m. he had to have a bowel 

movement and passed bright red blood per rectum.  Again around 2:00 a.m. just 

blood.  Denies any diarrhea.  However he does state that he has some left lower 

quadrant crampy abdominal pain associated with this.  With associated nausea 

but no vomiting.  No fever.  No chest pain or shortness of breath.  He is not 

on any anticoagulation.  He has not any vomiting.  Denies any melenic dark 

stools.





Past Medical History:  Prostate cancer





Past Surgical History:  No recent surgery





Social History:  Denies daily use drugs alcohol tobacco products.





Family History:  Noncontributory.








ROS   


REVIEW OF SYSTEMS:


A comprehensive 10 point review of systems is otherwise negative aside from 

elements mentioned in the history of present illness.








Exam   


Constitutional  appears well nontoxic, triage nursing summary reviewed, vital 

signs reviewed, awake/alert. 


Eyes   normal conjunctivae and sclera, EOMI, PERRLA. 


HENT   normal inspection, atraumatic, moist mucus membranes, no epistaxis, neck 

supple/ no meningismus, no raccoon eyes. 


Respiratory   clear to auscultation bilaterally, normal breath sounds, no 

respiratory distress, no wheezing. 


Cardiovascular   rate normal, regular rhythm, no murmur, no edema, distal 

pulses normal. 


Gastrointestinal   soft, non-tender, no rebound, no guarding, normal bowel 

sounds, no distension, no pulsatile mass. 


Genitourinary   no CVA tenderness. 


Musculoskeletal  no midline vertebral tenderness, full range of motion, no calf 

swelling, no tenderness of extremities, no meningismus, good pulses, 

neurovascularly intact.


Skin   pink, warm, & dry, no rash, skin atraumatic. 


Neurologic   awake, alert and oriented x 3, AAOx3, moves all 4 extremities 

equally, motor intact, sensory intact, CN II-XII intact, normal cerebellar, 

normal vision, normal speech. 


Psychiatric   normal mood/affect. 


Heme/Lymph/Immune   no lymphadenopathy.





Differential Diagnosis:  Includes but is not limited to in a particular order, 

diverticular bleed, acute diverticulitis, hemorrhoidal bleed, colitis, GI bleed





Medical Decision Making:  Plan for this patient IV establishment IV fluid bolus

, type and screen, check basic blood work CBC, CT scan abdomen pelvis with IV 

contrast rule out acute colitis vs diverticulitis.





Re-evaluation:








1837:  Patient CT scan called to me by Dr. Oppenheimer.  Shows diffuse 

descending colitis.  No free air.  No free fluid.  Patient be admitted to the 

hospitalist service for bright red blood per rectum and colitis.  Lactic acid 

is less than 2. Abdomen is not peritoneal there is no pain out of proportion 

with exam.  This most likely infectious colitis.  Will start with IV Flagyl and 

Cipro.  Will admit to the hospitalist service.  


Source: Patient





- Personal History


Current Tetanus/Diphtheria Vaccine: Unsure





- Medical/Surgical History


Hx Asthma: No


Hx Chronic Respiratory Disease: No


Hx Diabetes: No


Hx Cardiac Disease: No


Hx Renal Disease: No


Hx Cirrhosis: No


Hx Alcoholism: No


Hx HIV/AIDS: No


Hx Splenectomy or Spleen Trauma: No


Other PMH: back pain/ arthritis/ortho surg.





- Social History


Smoking Status: Never smoked


Constitutional: 


 Initial Vital Signs











Temperature (C)  37 C   10/27/17 16:24


 


Heart Rate  76   10/27/17 16:24


 


Respiratory Rate  17   10/27/17 16:24


 


Blood Pressure  138/97 H  10/27/17 16:24


 


O2 Sat (%)  93   10/27/17 16:24








 











O2 Delivery Mode               Room Air














Allergies/Adverse Reactions: 


 





No Known Allergies Allergy (Verified 10/27/17 16:24)


 








Home Medications: 














 Medication  Instructions  Recorded


 


NK [No Known Home Meds]  10/27/17














Medical Decision Making





- Diagnostics


Imaging Results: 


 Imaging Impressions





Abdomen CT  10/27/17 16:36


Impression: Descending colitis without perforation or obstruction.


 


Results called Dr. Zayas at 6:04. 


 


General information for patients regarding this examination can be found at 

[email or web address].


 


If you have questions or comments about this report, please contact me at [phone] (hospital) or [phone] (cell). 


 














- Data Points


Laboratory Results: 


 Laboratory Results





 10/27/17 16:59 





 10/27/17 16:59 





 











  10/27/17 10/27/17 10/27/17





  16:59 16:59 16:59


 


WBC      12.56 10^3/uL H 10^3/uL





     (3.80-9.50) 


 


RBC      5.03 10^6/uL 10^6/uL





     (4.40-6.38) 


 


Hgb      16.4 g/dL g/dL





     (13.7-17.5) 


 


Hct      47.4 % %





     (40.0-51.0) 


 


MCV      94.2 fL fL





     (81.5-99.8) 


 


MCH      32.6 pg pg





     (27.9-34.1) 


 


MCHC      34.6 g/dL g/dL





     (32.4-36.7) 


 


RDW      13.8 % %





     (11.5-15.2) 


 


Plt Count      207 10^3/uL 10^3/uL





     (150-400) 


 


MPV      8.3 fL L fL





     (8.7-11.7) 


 


Neut % (Auto)      75.8 % H %





     (39.3-74.2) 


 


Lymph % (Auto)      13.1 % L %





     (15.0-45.0) 


 


Mono % (Auto)      9.5 % %





     (4.5-13.0) 


 


Eos % (Auto)      1.1 % %





     (0.6-7.6) 


 


Baso % (Auto)      0.3 % %





     (0.3-1.7) 


 


Nucleat RBC Rel Count      0.0 % %





     (0.0-0.2) 


 


Absolute Neuts (auto)      9.51 10^3/uL H 10^3/uL





     (1.70-6.50) 


 


Absolute Lymphs (auto)      1.65 10^3/uL 10^3/uL





     (1.00-3.00) 


 


Absolute Monos (auto)      1.19 10^3/uL H 10^3/uL





     (0.30-0.80) 


 


Absolute Eos (auto)      0.14 10^3/uL 10^3/uL





     (0.03-0.40) 


 


Absolute Basos (auto)      0.04 10^3/uL 10^3/uL





     (0.02-0.10) 


 


Absolute Nucleated RBC      0.00 10^3/uL 10^3/uL





     (0-0.01) 


 


Immature Gran %      0.2 % %





     (0.0-1.1) 


 


Immature Gran #      0.03 10^3/uL 10^3/uL





     (0.00-0.10) 


 


PT    13.9 SEC SEC  





    (12.0-15.0)  


 


INR    1.08   





    (0.83-1.16)  


 


APTT    30.9 SEC SEC  





    (23.0-38.0)  


 


VBG Lactic Acid      





    


 


Sodium  141 mEq/L mEq/L    





   (134-144)   


 


Potassium  4.0 mEq/L mEq/L    





   (3.5-5.2)   


 


Chloride  105 mEq/L mEq/L    





   ()   


 


Carbon Dioxide  20 mEq/l L mEq/l    





   (22-31)   


 


Anion Gap  16 mEq/L mEq/L    





   (8-16)   


 


BUN  11 mg/dL mg/dL    





   (7-23)   


 


Creatinine  0.9 mg/dL mg/dL    





   (0.7-1.3)   


 


Estimated GFR  > 60     





    


 


Glucose  99 mg/dL mg/dL    





   ()   


 


Calcium  9.0 mg/dL mg/dL    





   (8.5-10.4)   


 


Total Bilirubin  1.2 mg/dL mg/dL    





   (0.1-1.4)   


 


Conjugated Bilirubin  0.3 mg/dL mg/dL    





   (0.0-0.5)   


 


Unconjugated Bilirubin  0.9 mg/dL mg/dL    





   (0.0-1.1)   


 


AST  23 IU/L IU/L    





   (17-59)   


 


ALT  26 IU/L IU/L    





   (21-72)   


 


Alkaline Phosphatase  78 IU/L IU/L    





   ()   


 


Total Protein  7.1 g/dL g/dL    





   (6.3-8.2)   


 


Albumin  4.4 g/dL g/dL    





   (3.5-5.0)   


 


Lipase  110 IU/L IU/L    





   ()   














  10/27/17





  16:59


 


WBC  





  


 


RBC  





  


 


Hgb  





  


 


Hct  





  


 


MCV  





  


 


MCH  





  


 


MCHC  





  


 


RDW  





  


 


Plt Count  





  


 


MPV  





  


 


Neut % (Auto)  





  


 


Lymph % (Auto)  





  


 


Mono % (Auto)  





  


 


Eos % (Auto)  





  


 


Baso % (Auto)  





  


 


Nucleat RBC Rel Count  





  


 


Absolute Neuts (auto)  





  


 


Absolute Lymphs (auto)  





  


 


Absolute Monos (auto)  





  


 


Absolute Eos (auto)  





  


 


Absolute Basos (auto)  





  


 


Absolute Nucleated RBC  





  


 


Immature Gran %  





  


 


Immature Gran #  





  


 


PT  





  


 


INR  





  


 


APTT  





  


 


VBG Lactic Acid  1.2 mmol/L mmol/L





   (0.7-2.1) 


 


Sodium  





  


 


Potassium  





  


 


Chloride  





  


 


Carbon Dioxide  





  


 


Anion Gap  





  


 


BUN  





  


 


Creatinine  





  


 


Estimated GFR  





  


 


Glucose  





  


 


Calcium  





  


 


Total Bilirubin  





  


 


Conjugated Bilirubin  





  


 


Unconjugated Bilirubin  





  


 


AST  





  


 


ALT  





  


 


Alkaline Phosphatase  





  


 


Total Protein  





  


 


Albumin  





  


 


Lipase  





  











Medications Given: 


 





Metronidazole/Sodium Chloride (Flagyl 500 Mg (Premix))  100 mls @ 100 mls/hr IV 

EDNOW ONE


   PRN Reason: Protocol


   Stop: 10/27/17 19:11


   Last Admin: 10/27/17 18:33 Dose:  100 mls





Discontinued Medications





Sodium Chloride (Ns)  1,000 mls @ 0 mls/hr IV EDNOW ONE; [facility]


   PRN Reason: Protocol


   Stop: 10/27/17 16:37


   Last Admin: 10/27/17 16:57 Dose:  1,000 mls


Ondansetron HCl (Zofran)  4 mg IVP EDNOW ONE


   Stop: 10/27/17 16:37


   Last Admin: 10/27/17 16:58 Dose:  4 mg








Departure





- Departure


Disposition: [facility]


Clinical Impression: 


 Colitis





Condition: Fair TE 11-